# Patient Record
Sex: FEMALE | Race: WHITE | NOT HISPANIC OR LATINO | Employment: OTHER | URBAN - METROPOLITAN AREA
[De-identification: names, ages, dates, MRNs, and addresses within clinical notes are randomized per-mention and may not be internally consistent; named-entity substitution may affect disease eponyms.]

---

## 2017-03-20 ENCOUNTER — HOSPITAL ENCOUNTER (OUTPATIENT)
Dept: FAMILY MEDICINE CLINIC | Facility: CLINIC | Age: 67
Setting detail: SPECIMEN
Discharge: HOME | End: 2017-03-20
Attending: FAMILY MEDICINE | Admitting: FAMILY MEDICINE

## 2017-10-02 ENCOUNTER — HOSPITAL ENCOUNTER (OUTPATIENT)
Dept: FAMILY MEDICINE CLINIC | Facility: CLINIC | Age: 67
Setting detail: SPECIMEN
Discharge: HOME | End: 2017-10-02
Attending: FAMILY MEDICINE | Admitting: FAMILY MEDICINE

## 2017-10-02 LAB
ALBUMIN SERPL-MCNC: 3.7 G/DL (ref 3.5–4.8)
ALBUMIN/GLOB SERPL: 1.2 {RATIO} (ref 1–1.7)
ALP SERPL-CCNC: 71 IU/L (ref 32–91)
ALT SERPL-CCNC: 27 IU/L (ref 14–54)
ANION GAP SERPL CALC-SCNC: 12.5 MMOL/L (ref 10–20)
AST SERPL-CCNC: 25 IU/L (ref 15–41)
BASOPHILS # BLD AUTO: 0.1 10*3/UL (ref 0–0.2)
BASOPHILS NFR BLD AUTO: 1 % (ref 0–2)
BILIRUB SERPL-MCNC: 0.7 MG/DL (ref 0.3–1.2)
BUN SERPL-MCNC: 17 MG/DL (ref 8–20)
BUN/CREAT SERPL: 24.3 (ref 5.4–26.2)
CALCIUM SERPL-MCNC: 9.2 MG/DL (ref 8.9–10.3)
CHLORIDE SERPL-SCNC: 100 MMOL/L (ref 101–111)
CHOLEST SERPL-MCNC: 137 MG/DL
CHOLEST/HDLC SERPL: 3 {RATIO}
CONV CO2: 28 MMOL/L (ref 22–32)
CONV LDL CHOLESTEROL DIRECT: 75 MG/DL (ref 0–100)
CONV TOTAL PROTEIN: 6.8 G/DL (ref 6.1–7.9)
CREAT UR-MCNC: 0.7 MG/DL (ref 0.4–1)
DIFFERENTIAL METHOD BLD: (no result)
EOSINOPHIL # BLD AUTO: 0.3 10*3/UL (ref 0–0.3)
EOSINOPHIL # BLD AUTO: 4 % (ref 0–3)
ERYTHROCYTE [DISTWIDTH] IN BLOOD BY AUTOMATED COUNT: 13.6 % (ref 11.5–14.5)
GLOBULIN UR ELPH-MCNC: 3.1 G/DL (ref 2.5–3.8)
GLUCOSE SERPL-MCNC: 159 MG/DL (ref 65–99)
HCT VFR BLD AUTO: 38.7 % (ref 35–49)
HDLC SERPL-MCNC: 46 MG/DL
HGB BLD-MCNC: 13.2 G/DL (ref 12–15)
LDLC/HDLC SERPL: 1.6 {RATIO}
LIPID INTERPRETATION: NORMAL
LYMPHOCYTES # BLD AUTO: 1.8 10*3/UL (ref 0.8–4.8)
LYMPHOCYTES NFR BLD AUTO: 22 % (ref 18–42)
MCH RBC QN AUTO: 29.5 PG (ref 26–32)
MCHC RBC AUTO-ENTMCNC: 34 G/DL (ref 32–36)
MCV RBC AUTO: 86.7 FL (ref 80–94)
MONOCYTES # BLD AUTO: 0.6 10*3/UL (ref 0.1–1.3)
MONOCYTES NFR BLD AUTO: 7 % (ref 2–11)
NEUTROPHILS # BLD AUTO: 5.4 10*3/UL (ref 2.3–8.6)
NEUTROPHILS NFR BLD AUTO: 66 % (ref 50–75)
NRBC BLD AUTO-RTO: 0 /100{WBCS}
NRBC/RBC NFR BLD MANUAL: 0 10*3/UL
PLATELET # BLD AUTO: 237 10*3/UL (ref 150–450)
PMV BLD AUTO: 7.6 FL (ref 7.4–10.4)
POTASSIUM SERPL-SCNC: 3.5 MMOL/L (ref 3.6–5.1)
RBC # BLD AUTO: 4.46 10*6/UL (ref 4–5.4)
SODIUM SERPL-SCNC: 137 MMOL/L (ref 136–144)
TRIGL SERPL-MCNC: 117 MG/DL
VLDLC SERPL CALC-MCNC: 16.4 MG/DL
WBC # BLD AUTO: 8.2 10*3/UL (ref 4.5–11.5)

## 2017-11-06 ENCOUNTER — TELEPHONE (OUTPATIENT)
Dept: GASTROENTEROLOGY | Facility: CLINIC | Age: 67
End: 2017-11-06

## 2017-11-06 ENCOUNTER — OFFICE VISIT (OUTPATIENT)
Dept: GASTROENTEROLOGY | Facility: CLINIC | Age: 67
End: 2017-11-06

## 2017-11-06 VITALS
BODY MASS INDEX: 46.41 KG/M2 | HEIGHT: 62 IN | TEMPERATURE: 98 F | DIASTOLIC BLOOD PRESSURE: 76 MMHG | SYSTOLIC BLOOD PRESSURE: 124 MMHG | WEIGHT: 252.2 LBS

## 2017-11-06 DIAGNOSIS — R10.31 RIGHT LOWER QUADRANT ABDOMINAL PAIN: ICD-10-CM

## 2017-11-06 DIAGNOSIS — R10.13 EPIGASTRIC PAIN: Primary | ICD-10-CM

## 2017-11-06 DIAGNOSIS — E66.01 MORBID OBESITY (HCC): ICD-10-CM

## 2017-11-06 DIAGNOSIS — R13.14 PHARYNGOESOPHAGEAL DYSPHAGIA: ICD-10-CM

## 2017-11-06 PROCEDURE — 99204 OFFICE O/P NEW MOD 45 MIN: CPT | Performed by: INTERNAL MEDICINE

## 2017-11-06 RX ORDER — SAXAGLIPTIN 2.5 MG/1
TABLET, FILM COATED ORAL
Refills: 4 | COMMUNITY
Start: 2017-10-26 | End: 2019-07-30 | Stop reason: SDUPTHER

## 2017-11-06 RX ORDER — CHLORTHALIDONE 25 MG/1
TABLET ORAL
Refills: 4 | COMMUNITY
Start: 2017-10-25 | End: 2020-01-15 | Stop reason: SDUPTHER

## 2017-11-06 RX ORDER — ATORVASTATIN CALCIUM 40 MG/1
TABLET, FILM COATED ORAL
Refills: 5 | COMMUNITY
Start: 2017-10-25 | End: 2020-05-26

## 2017-11-06 RX ORDER — SODIUM CHLORIDE, SODIUM LACTATE, POTASSIUM CHLORIDE, CALCIUM CHLORIDE 600; 310; 30; 20 MG/100ML; MG/100ML; MG/100ML; MG/100ML
30 INJECTION, SOLUTION INTRAVENOUS CONTINUOUS
Status: CANCELLED | OUTPATIENT
Start: 2017-11-13

## 2017-11-06 RX ORDER — LOSARTAN POTASSIUM 100 MG/1
TABLET ORAL
Refills: 11 | COMMUNITY
Start: 2017-10-25 | End: 2020-02-24

## 2017-11-06 NOTE — TELEPHONE ENCOUNTER
Call to DR Angelia Springer's office  @ [phone] - VM left with Alejandra to request recent CT and labs be faxed to .    Call to [facility] @ [phone] and spoke with Shasha.  Request EGD and c/s with path reports be faxed to [phone].  Shasha states last EGD  and c/s in 2011 - no path report.  Faxing 8 pages.

## 2017-11-06 NOTE — PROGRESS NOTES
"Chief Complaint   Patient presents with   • Abdominal Pain       Subjective     HPI    Anais West is a 67 y.o. female with a past medical history noted below who presents for evaluation of abdominal pain.  Pain has been present for about 6 months.  Located at the epigastric region.  Feels like \" a whole lot of little knives\" in this area.  Not constant.  Not severe.  No associated with eating.  Feels this with laying down.  She has also feels a protrusion in this area-- like a bulging.  She also endorses R sided abominal pain, both upper and lower.  Painful to lay on her right side.    Awakes nauseated but there is no vomiting.  She did lose 40# with medifast in 2016 but has regained her weight back and feels it is mostly abdominal.  She is involved in a whole food program and trying to eat healthier.  She denies any heartburn/reflux.  She did have brief episode of dysphagia eating chinese food a few weeks ago. She had to force herself to vomit to resolve the pain and impaction.      She reports a CT of the abd and pelvis with her pcp for workup of her symptoms about a month ago and reports this as normal.      No constipation, some diarrhea in the morning with urgency.      No family history of colon cancer or polyps.    Last EGD and colonoscopy was with Demetrio at  about 2010.  She thinks they were normal.    No smoking.  No excess ETOH. 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        Past Medical History:   Diagnosis Date   • Anxiety    • Depression    • Diabetes mellitus    • Hyperlipidemia    • Hypertension          Current Outpatient Prescriptions:   •  atorvastatin (LIPITOR) 40 MG tablet, TK 1 T PO D HS, Disp: , Rfl: 5  •  chlorthalidone (HYGROTON) 25 MG tablet, TK 1 T PO D, Disp: , Rfl: 4  •  losartan (COZAAR) 100 MG tablet, TK 1 T PO D, Disp: , Rfl: 11  •  metFORMIN (GLUCOPHAGE) 1000 MG tablet, TK 1 T PO BID WF, Disp: , Rfl: 3  •  metoprolol tartrate (LOPRESSOR) 25 MG tablet, TK 1 T PO " BID, Disp: , Rfl: 5  •  ONGLYZA 2.5 MG tablet, TK 1 T PO D, Disp: , Rfl: 4  •  sertraline (ZOLOFT) 50 MG tablet, TK 1 T PO BID, Disp: , Rfl: 1    Allergies   Allergen Reactions   • Penicillins    • Sulfa Antibiotics        Social History     Social History   • Marital status:      Spouse name: N/A   • Number of children: N/A   • Years of education: N/A     Occupational History   • Not on file.     Social History Main Topics   • Smoking status: Never Smoker   • Smokeless tobacco: Never Used   • Alcohol use No   • Drug use: Not on file   • Sexual activity: Not on file     Other Topics Concern   • Not on file     Social History Narrative   • No narrative on file       History reviewed. No pertinent family history.    Review of Systems   Constitutional: Negative for activity change, appetite change and fatigue.   HENT: Positive for trouble swallowing. Negative for sore throat.    Eyes: Negative.    Respiratory: Negative.    Gastrointestinal: Positive for abdominal pain and nausea. Negative for abdominal distention, anal bleeding, blood in stool and vomiting.   Endocrine: Negative for cold intolerance and heat intolerance.   Genitourinary: Negative for difficulty urinating, dysuria and frequency.   Musculoskeletal: Negative for arthralgias, back pain and myalgias.   Skin: Negative.    Hematological: Negative for adenopathy. Does not bruise/bleed easily.   All other systems reviewed and are negative.      Objective     Vitals:    11/06/17 1017   BP: 124/76   Temp: 98 °F (36.7 °C)     Last 2 weights    11/06/17  1017   Weight: 252 lb 3.2 oz (114 kg)     Body mass index is 46.13 kg/(m^2).    Physical Exam   Constitutional: She is oriented to person, place, and time. She appears well-developed and well-nourished. No distress.   Morbidly obese, mostly central   HENT:   Head: Normocephalic and atraumatic.   Right Ear: External ear normal.   Left Ear: External ear normal.   Nose: Nose normal.   Mouth/Throat: Oropharynx  is clear and moist.   Eyes: Conjunctivae and EOM are normal. Right eye exhibits no discharge. Left eye exhibits no discharge. No scleral icterus.   Neck: Normal range of motion. Neck supple. No thyromegaly present.   No supraclavicular adenopathy   Cardiovascular: Normal rate, regular rhythm, normal heart sounds and intact distal pulses.  Exam reveals no gallop.    No murmur heard.  No lower extremity edema   Pulmonary/Chest: Effort normal and breath sounds normal. No respiratory distress. She has no wheezes.   Abdominal: Soft. Normal appearance and bowel sounds are normal. She exhibits no distension and no mass. There is no hepatosplenomegaly. There is tenderness. There is no rigidity, no rebound and no guarding.   Tender to palpation epigastric and RLQ   Genitourinary:   Genitourinary Comments: Rectal exam deferred   Musculoskeletal: Normal range of motion. She exhibits no edema or tenderness.   No atrophy of upper or lower extremities.  Normal digits and nails of both hands.   Lymphadenopathy:     She has no cervical adenopathy.   Neurological: She is alert and oriented to person, place, and time. She displays no atrophy. Coordination normal.   Skin: Skin is warm and dry. No rash noted. She is not diaphoretic. No erythema.   Psychiatric: She has a normal mood and affect. Her behavior is normal. Judgment and thought content normal.   Vitals reviewed.      No results found for: WBC, RBC, HGB, HCT, MCV, MCH, MCHC, RDW, RDWSD, MPV, PLT, NEUTRORELPCT, LYMPHORELPCT, MONORELPCT, EOSRELPCT, BASORELPCT, AUTOIGPER, NEUTROABS, LYMPHSABS, MONOSABS, EOSABS, BASOSABS, AUTOIGNUM, NRBC    No results found for: GLUCOSE, NA, K, CO2, CL, ANIONGAP, CREATININE, BUN, BCR, CALCIUM, EGFRIFNONA, ALKPHOS, PROTEINTOT, ALT, AST, BILITOT, ALBUMIN, GLOB, LABIL2      Imaging Results (last 7 days)     ** No results found for the last 168 hours. **            No notes on file    Assessment/Plan    1. Epigastric pain: ? Esophagitis vs hiatal  hernia vs musculskeletal.  I am concerned for some silent reflux given her recent dysphagia episode    2. Dysphagia: limited episode requiring forced regurgitation.  ? EoE, stricture, spasm    3. RLQ pain: ? Functional vs cramp, she reports a normal recent CT    4. Morbid obesity: BMI 46 with comorbidities    Plan  -EGD for further evaluation of epigastric pain, dysphagia  -will get her most recent labs and imaging from her pcp  -will get her last colonoscopy report  -to start IB Guard TID  -we discussed her obesity, I advised her to consider bariatric surgery due to her comorbidities and because she is healthy enough to benefit from it  -further recommendations after her EGD      Anais was seen today for abdominal pain.    Diagnoses and all orders for this visit:    Epigastric pain  -     Case Request; Standing  -     Implement Anesthesia Orders Day of Procedure; Standing  -     Obtain Informed Consent; Standing  -     lactated ringers infusion; Infuse 30 mL/hr into a venous catheter Continuous.  -     Case Request    Right lower quadrant abdominal pain    Morbid obesity    Pharyngoesophageal dysphagia  -     Case Request; Standing  -     Implement Anesthesia Orders Day of Procedure; Standing  -     Obtain Informed Consent; Standing  -     lactated ringers infusion; Infuse 30 mL/hr into a venous catheter Continuous.  -     Case Request      I have discussed the above plan with the patient.  They verbalize understanding and are in agreement with the plan.  They have been advised to contact the office for any questions, concerns, or changes related to their health.    Dictated utilizing Dragon dictation

## 2017-11-06 NOTE — PATIENT INSTRUCTIONS
Schedule the EGD    Start IB guard three times a day    I will get your past labs, CT and endoscopy reports    For any additional questions, concerns or changes to your condition after today's office visit please contact the office at [phone].

## 2017-11-06 NOTE — TELEPHONE ENCOUNTER
----- Message from Radhika Marcum MD sent at 11/6/2017 11:51 AM EST -----  She had CT and labs with her PCP, Angelia Springer recently, as well as an EGD and colonoscopy with Demetrio when he was at .  Can we pls get those results-- thanks!

## 2017-11-13 ENCOUNTER — HOSPITAL ENCOUNTER (OUTPATIENT)
Facility: HOSPITAL | Age: 67
Setting detail: HOSPITAL OUTPATIENT SURGERY
Discharge: HOME | End: 2017-11-13
Attending: INTERNAL MEDICINE | Admitting: INTERNAL MEDICINE

## 2017-11-13 VITALS
HEIGHT: 62 IN | SYSTOLIC BLOOD PRESSURE: 121 MMHG | HEART RATE: 93 BPM | TEMPERATURE: 98.8 F | OXYGEN SATURATION: 96 % | DIASTOLIC BLOOD PRESSURE: 61 MMHG | WEIGHT: 250.5 LBS | RESPIRATION RATE: 14 BRPM | BODY MASS INDEX: 46.1 KG/M2

## 2017-11-13 DIAGNOSIS — R10.13 EPIGASTRIC PAIN: ICD-10-CM

## 2017-11-13 DIAGNOSIS — R13.14 PHARYNGOESOPHAGEAL DYSPHAGIA: ICD-10-CM

## 2017-11-13 PROCEDURE — G0463 HOSPITAL OUTPT CLINIC VISIT: HCPCS | Performed by: INTERNAL MEDICINE

## 2017-11-13 RX ORDER — SODIUM CHLORIDE, SODIUM LACTATE, POTASSIUM CHLORIDE, CALCIUM CHLORIDE 600; 310; 30; 20 MG/100ML; MG/100ML; MG/100ML; MG/100ML
30 INJECTION, SOLUTION INTRAVENOUS CONTINUOUS
Status: DISCONTINUED | OUTPATIENT
Start: 2017-11-13 | End: 2017-11-13 | Stop reason: HOSPADM

## 2017-11-13 RX ORDER — SODIUM CHLORIDE 0.9 % (FLUSH) 0.9 %
1-10 SYRINGE (ML) INJECTION AS NEEDED
Status: DISCONTINUED | OUTPATIENT
Start: 2017-11-13 | End: 2017-11-13 | Stop reason: HOSPADM

## 2017-11-13 NOTE — NURSING NOTE
Dr Pugh returned call. Informed of new onset Bigeminy. Dr Pugh said she could go to the ER or call his office to be seen in the morning.  Patient informed, patient will see MD in office tomorrow. Patient states she is asymptomatic. Dr Pugh's office number given to patient. Patient dressed, discharged home with belongings.

## 2017-11-13 NOTE — PROGRESS NOTES
Pt in [facility], which is new.  Case discussed with Dr Cobb, who prefers cardiac evaluation prior to any sedation.  She does have a strong family history of cardiac disease.    Consult called to Dr Pugh, will see pt prior to discharge from endoscopy.    Radhika Marcum MD  [facility]

## 2017-11-13 NOTE — PERIOPERATIVE NURSING NOTE
Dr donald and dr franz notified of bigeminy, new for pt with no c/o pain or discomfort. Dr donald spoke with pt at bedside, cancelled case for today, will consult cardiology.

## 2017-11-13 NOTE — NURSING NOTE
Still waiting for return call from cardiology. Called [facility] office for cardiology consult for Dr Pugh to see patient in endo. No return call

## 2017-11-13 NOTE — NURSING NOTE
Dr Marcum called Dr Pugh's office office again for consult. Still waiting for patient to be seen Dr Pugh

## 2017-11-13 NOTE — PLAN OF CARE
Problem: Patient Care Overview (Adult)  Goal: Plan of Care Review  Outcome: Ongoing (interventions implemented as appropriate)  Goal: Adult Individualization and Mutuality  Outcome: Ongoing (interventions implemented as appropriate)  Goal: Discharge Needs Assessment  Outcome: Ongoing (interventions implemented as appropriate)    11/13/17 1421   Living Environment   Transportation Available car;family or friend will provide   Discharge Needs Assessment   Discharge Disposition home or self-care         Problem: GI Endoscopy (Adult)  Goal: Signs and Symptoms of Listed Potential Problems Will be Absent or Manageable (GI Endoscopy)  Outcome: Ongoing (interventions implemented as appropriate)

## 2017-11-15 ENCOUNTER — APPOINTMENT (OUTPATIENT)
Dept: LAB | Facility: HOSPITAL | Age: 67
End: 2017-11-15

## 2017-11-15 ENCOUNTER — OFFICE VISIT (OUTPATIENT)
Dept: CARDIOLOGY | Facility: CLINIC | Age: 67
End: 2017-11-15

## 2017-11-15 VITALS
BODY MASS INDEX: 46.45 KG/M2 | HEIGHT: 62 IN | DIASTOLIC BLOOD PRESSURE: 80 MMHG | HEART RATE: 81 BPM | SYSTOLIC BLOOD PRESSURE: 120 MMHG | WEIGHT: 252.4 LBS

## 2017-11-15 DIAGNOSIS — R06.09 DYSPNEA ON EXERTION: ICD-10-CM

## 2017-11-15 DIAGNOSIS — R00.8 VENTRICULAR BIGEMINY SEEN ON CARDIAC MONITOR: Primary | ICD-10-CM

## 2017-11-15 DIAGNOSIS — I10 ESSENTIAL HYPERTENSION: ICD-10-CM

## 2017-11-15 LAB
ALBUMIN SERPL-MCNC: 4.3 G/DL (ref 3.5–5.2)
ALBUMIN/GLOB SERPL: 1.2 G/DL
ALP SERPL-CCNC: 91 U/L (ref 39–117)
ALT SERPL W P-5'-P-CCNC: 26 U/L (ref 1–33)
ANION GAP SERPL CALCULATED.3IONS-SCNC: 13.9 MMOL/L
AST SERPL-CCNC: 18 U/L (ref 1–32)
BASOPHILS # BLD AUTO: 0.04 10*3/MM3 (ref 0–0.2)
BASOPHILS NFR BLD AUTO: 0.4 % (ref 0–1.5)
BILIRUB SERPL-MCNC: 0.5 MG/DL (ref 0.1–1.2)
BUN BLD-MCNC: 18 MG/DL (ref 8–23)
BUN/CREAT SERPL: 28.6 (ref 7–25)
CALCIUM SPEC-SCNC: 9.9 MG/DL (ref 8.6–10.5)
CHLORIDE SERPL-SCNC: 96 MMOL/L (ref 98–107)
CO2 SERPL-SCNC: 29.1 MMOL/L (ref 22–29)
CREAT BLD-MCNC: 0.63 MG/DL (ref 0.57–1)
DEPRECATED RDW RBC AUTO: 44 FL (ref 37–54)
EOSINOPHIL # BLD AUTO: 0.23 10*3/MM3 (ref 0–0.7)
EOSINOPHIL NFR BLD AUTO: 2.5 % (ref 0.3–6.2)
ERYTHROCYTE [DISTWIDTH] IN BLOOD BY AUTOMATED COUNT: 13.4 % (ref 11.7–13)
GFR SERPL CREATININE-BSD FRML MDRD: 95 ML/MIN/1.73
GLOBULIN UR ELPH-MCNC: 3.6 GM/DL
GLUCOSE BLD-MCNC: 182 MG/DL (ref 65–99)
HCT VFR BLD AUTO: 42.6 % (ref 35.6–45.5)
HGB BLD-MCNC: 13.7 G/DL (ref 11.9–15.5)
IMM GRANULOCYTES # BLD: 0.02 10*3/MM3 (ref 0–0.03)
IMM GRANULOCYTES NFR BLD: 0.2 % (ref 0–0.5)
LYMPHOCYTES # BLD AUTO: 2.68 10*3/MM3 (ref 0.9–4.8)
LYMPHOCYTES NFR BLD AUTO: 29.4 % (ref 19.6–45.3)
MAGNESIUM SERPL-MCNC: 2.1 MG/DL (ref 1.6–2.4)
MCH RBC QN AUTO: 29.2 PG (ref 26.9–32)
MCHC RBC AUTO-ENTMCNC: 32.2 G/DL (ref 32.4–36.3)
MCV RBC AUTO: 90.8 FL (ref 80.5–98.2)
MONOCYTES # BLD AUTO: 0.39 10*3/MM3 (ref 0.2–1.2)
MONOCYTES NFR BLD AUTO: 4.3 % (ref 5–12)
NEUTROPHILS # BLD AUTO: 5.76 10*3/MM3 (ref 1.9–8.1)
NEUTROPHILS NFR BLD AUTO: 63.2 % (ref 42.7–76)
PLATELET # BLD AUTO: 283 10*3/MM3 (ref 140–500)
PMV BLD AUTO: 9.6 FL (ref 6–12)
POTASSIUM BLD-SCNC: 3.8 MMOL/L (ref 3.5–5.2)
PROT SERPL-MCNC: 7.9 G/DL (ref 6–8.5)
RBC # BLD AUTO: 4.69 10*6/MM3 (ref 3.9–5.2)
SODIUM BLD-SCNC: 139 MMOL/L (ref 136–145)
T4 FREE SERPL-MCNC: 1.17 NG/DL (ref 0.93–1.7)
TSH SERPL DL<=0.05 MIU/L-ACNC: 2.06 MIU/ML (ref 0.27–4.2)
WBC NRBC COR # BLD: 9.12 10*3/MM3 (ref 4.5–10.7)

## 2017-11-15 PROCEDURE — 84443 ASSAY THYROID STIM HORMONE: CPT | Performed by: NURSE PRACTITIONER

## 2017-11-15 PROCEDURE — 83735 ASSAY OF MAGNESIUM: CPT | Performed by: NURSE PRACTITIONER

## 2017-11-15 PROCEDURE — 80053 COMPREHEN METABOLIC PANEL: CPT | Performed by: NURSE PRACTITIONER

## 2017-11-15 PROCEDURE — 36415 COLL VENOUS BLD VENIPUNCTURE: CPT | Performed by: NURSE PRACTITIONER

## 2017-11-15 PROCEDURE — 85025 COMPLETE CBC W/AUTO DIFF WBC: CPT | Performed by: NURSE PRACTITIONER

## 2017-11-15 PROCEDURE — 84439 ASSAY OF FREE THYROXINE: CPT | Performed by: NURSE PRACTITIONER

## 2017-11-15 PROCEDURE — 99243 OFF/OP CNSLTJ NEW/EST LOW 30: CPT | Performed by: NURSE PRACTITIONER

## 2017-11-15 NOTE — PROGRESS NOTES
Date of Office Visit: 11/15/2017  Encounter Provider: LINDA Batres  Place of Service: [facility]  Patient Name: Anais West  :1950    Chief Complaint   Patient presents with   • Shortness of Breath   :     HPI: Anais West is a 67 y.o. female who presents today for evaluation of abnormal telemetry strip indicating ventricular bigeminy.  This visit is a consultation requested by Dr. Radhika Marcum.  She has a past medical history of anxiety, depression, diabetes mellitus, hypertension, hyperlipidemia, and sleep apnea (compliant with CPAP machine). She reports a family history of her father having a heart attack at age 50 and passing away at age 68.  She had a brother that had a myocardial infarction at age 50 and a second brother that had stent placement at age 50.  Her mother's history and tell atrial fibrillation.  Her blood pressure and heart rate are both well-controlled on her current medication regimen.    She has been experiencing epigastric discomfort that she describes a sharp when lying down accompanied with nausea.  She recently underwent endoscopy on 17 and while on the telemetry monitor was noted to have ventricular bigeminy (new onset).  Dr. Abiodun Pugh was notified by Dr. Marcum and she was recommended to follow-up in our office for further evaluation. She was asymptomatic during the bigeminy.  Her EGD was canceled.    She denies any palpitations.  She occasionally will experience a midsternal chest pressure when lying on her back and her symptoms resolved when she turns to the side.  She denies any chest pain with exertional activities.  She does experience shortness of breath when doing housework which is the most activity that she typically will do.  Her symptoms will resolve if she slows down or with rest.  She denies paroxysmal nocturnal dyspnea, orthopnea, cough, edema, dizziness, or syncope.  She denies recently being ill  or having fevers.  She has not taken any decongestants, illicit drugs, increased caffeinated beverage intake or alcohol.    Past Medical History:   Diagnosis Date   • Anxiety    • Depression    • Diabetes mellitus    • Hyperlipidemia    • Hypertension    • Sleep apnea        Past Surgical History:   Procedure Laterality Date   • CHOLECYSTECTOMY     • COLONOSCOPY  2010    [facility] patient normal    • HYSTERECTOMY     • UPPER GASTROINTESTINAL ENDOSCOPY  2010    [facility] patient normal        Social History     Social History   • Marital status:      Spouse name: N/A   • Number of children: N/A   • Years of education: N/A     Occupational History   • Not on file.     Social History Main Topics   • Smoking status: Never Smoker   • Smokeless tobacco: Never Used   • Alcohol use No      Comment: Daily caffeine use   • Drug use: No   • Sexual activity: Defer     Other Topics Concern   • Not on file     Social History Narrative       Family History   Problem Relation Age of Onset   • Atrial fibrillation Mother    • Heart disease Father    • Heart attack Brother    • Heart disease Brother    • Heart disease Maternal Grandmother    • Heart disease Brother        Review of Systems   Constitution: Positive for malaise/fatigue. Negative for chills, diaphoresis, fever, night sweats, weight gain and weight loss.   HENT: Negative for hearing loss, nosebleeds, sore throat and tinnitus.    Eyes: Negative for blurred vision, double vision, pain and visual disturbance.   Cardiovascular: Positive for dyspnea on exertion. Negative for chest pain, claudication, cyanosis, irregular heartbeat, leg swelling, near-syncope, orthopnea, palpitations, paroxysmal nocturnal dyspnea and syncope.   Respiratory: Negative for cough, hemoptysis, snoring and wheezing.    Endocrine: Positive for heat intolerance. Negative for cold intolerance and polyuria.   Hematologic/Lymphatic: Negative for bleeding problem. Does not  "bruise/bleed easily.   Skin: Negative for color change, dry skin, flushing and itching.   Musculoskeletal: Positive for joint pain and myalgias. Negative for falls, joint swelling, muscle cramps and muscle weakness.   Gastrointestinal: Positive for abdominal pain, diarrhea and nausea. Negative for constipation, heartburn, melena and vomiting.   Genitourinary: Positive for frequency. Negative for dysuria and hematuria.   Neurological: Positive for excessive daytime sleepiness. Negative for dizziness, light-headedness, loss of balance, numbness, paresthesias, seizures and vertigo.   Psychiatric/Behavioral: Positive for depression. Negative for altered mental status, memory loss and substance abuse. The patient is nervous/anxious. The patient does not have insomnia.    Allergic/Immunologic: Negative for environmental allergies.       Allergies   Allergen Reactions   • Penicillins    • Sulfa Antibiotics          Current Outpatient Prescriptions:   •  atorvastatin (LIPITOR) 40 MG tablet, take 1 po daily, Disp: , Rfl: 5  •  chlorthalidone (HYGROTON) 25 MG tablet, take 1 po daily, Disp: , Rfl: 4  •  losartan (COZAAR) 100 MG tablet, take 1 po daily, Disp: , Rfl: 11  •  metFORMIN (GLUCOPHAGE) 1000 MG tablet, take 1 po  twice daily, Disp: , Rfl: 3  •  metoprolol tartrate (LOPRESSOR) 25 MG tablet, take 1 po twice daily, Disp: , Rfl: 5  •  ONGLYZA 2.5 MG tablet, take 1 po daily, Disp: , Rfl: 4  •  sertraline (ZOLOFT) 50 MG tablet, take 1 po twice daily, Disp: , Rfl: 1     Objective:     Vitals:    11/15/17 1333 11/15/17 1334   BP: 120/80 120/80   BP Location: Right arm Left arm   Pulse: 81    Weight: 252 lb 6.4 oz (114 kg)    Height: 62\" (157.5 cm)      Body mass index is 46.16 kg/(m^2).    PHYSICAL EXAM:    Vitals Reviewed.   General Appearance: No acute distress, well developed and well nourished. Obese.   Eyes: Conjunctiva and lids: No erythema, swelling, or discharge. Sclera non-icteric. Glasses.   HENT: Atraumatic, " normocephalic. External eyes, ears, and nose normal. No hearing loss noted. Mucous membranes normal. Lips not cyanotic. Neck supple with no tenderness.  Respiratory: No signs of respiratory distress. Respiration rhythm and depth normal.   Clear to auscultation. No rales, crackles, rhonchi, or wheezing auscultated.   Cardiovascular:  Jugular Venous Pressure: Normal  Heart Rate and Rhythm: Normal, Heart Sounds: Normal S1 and S2. No S3 or S4 noted.  Murmurs: No murmurs noted. No rubs, thrills, or gallops.   Arterial Pulses: Carotid pulses normal. No carotid bruit noted. Posterior tibialis and dorsalis pedis pulses normal.   Lower Extremities: No edema noted.  Gastrointestinal:  Abdomen soft, non-distended, non-tender. Normal bowel sounds. No hepatomegaly.   Musculoskeletal: Normal movement of extremities  Skin and Nails: General appearance normal. No pallor, cyanosis, diaphoresis. Skin temperature normal. No clubbing of fingernails.   Psychiatric: Patient alert and oriented to person, place, and time. Speech and behavior appropriate. Normal mood and affect.       ECG 12 Lead  Date/Time: 11/16/2017 1:29 PM  Performed by: JAELYN LYLES  Authorized by: JAELYN LYLES   Comparison: not compared with previous ECG   Rhythm: sinus rhythm  Rate: normal  BPM: 81  Conduction: conduction normal  ST Segments: ST segments normal  T Waves: T waves normal  QRS axis: normal  Other: no other findings  Clinical impression: normal ECG              Assessment:       Diagnosis Plan   1. Ventricular bigeminy seen on cardiac monitor  Adult Stress Echo W/ Cont or Stress Agent if Necessary Per Protocol    Comprehensive Metabolic Panel    CBC Auto Differential    Magnesium    TSH    T4, Free   2. Dyspnea on exertion  Adult Stress Echo W/ Cont or Stress Agent if Necessary Per Protocol    Comprehensive Metabolic Panel    CBC Auto Differential    Magnesium    TSH    T4, Free   3. Essential hypertension  Comprehensive Metabolic Panel    CBC  Auto Differential    Magnesium    TSH    T4, Free          Plan:       Mrs. West presents today for evaluation of ventricular bigeminy seen on her telemetry monitor before having an EGD.  The EGD was subsequently canceled.  She denies any palpitations, but does experience dyspnea upon exertion.  I discussed plan of care Dr. Abiodun Pugh.  We will obtain some basic blood work including a CBC, CMP, magnesium, TSH, and free T4.  We will arrange for her to have a stress echocardiogram to be completed in our office and I will call her with the results.  Her blood pressure is well-controlled today and EKG is normal.    As always, it has been a pleasure to participate in your patient's care.      Sincerely,         LINDA Mayes

## 2017-11-15 NOTE — PATIENT INSTRUCTIONS
Premature Ventricular Contraction (PVC)  Bigemney - PVC  Every other normal beat    A premature ventricular contraction is an irregularity in the normal heart rhythm. These contractions are extra heartbeats that occur too early in the normal sequence. In most cases, these contractions are harmless and do not require treatment.  CAUSES  Premature ventricular contractions may occur without a known cause. In healthy people, the extra contractions may be caused by:  · Smoking.  · Drinking alcohol.  · Caffeine.  · Certain medicines.  · Some illegal drugs.  · Stress.  Sometimes, changes in chemicals in the blood (electrolytes) can also cause premature ventricular contractions. They can also occur in people with heart diseases that cause a decrease in blood flow to the heart.  SIGNS AND SYMPTOMS  Premature ventricular contractions often do not cause any symptoms. In some cases, you may have a feeling of your heart beating fast or skipping a beat (palpitations).  DIAGNOSIS  Your health care provider will take your medical history and do a physical exam. During the exam, the health care provider will check for irregular heartbeats. Various tests may be done to help diagnose premature ventricular contractions. These tests may include:  · An ECG (electrocardiogram) to monitor the electrical activity of your heart.  · Holter monitor testing. A Holter monitor is a portable device that can monitor the electrical activity of your heart over longer periods of time.  · Stress tests to see how exercise affects your heart rhythm.  · Echocardiogram. This test uses sound waves (ultrasound) to produce an image of your heart.  · Electrophysiology study. This is used to evaluate the electrical conduction system of your heart.  TREATMENT  Usually, no treatment is needed. You may be advised to avoid things that can trigger the premature contractions, such as caffeine or alcohol. Medicines are sometimes given if symptoms are severe or if the  extra heartbeats are very frequent. Treatment may also be needed for an underlying cause of the contractions if one is found.  HOME CARE INSTRUCTIONS  · Take medicines only as directed by your health care provider.  · Make any lifestyle changes recommended by your health care provider. These may include:    Quitting smoking.    Avoiding or limiting caffeine or alcohol.    Exercising. Talk to your health care provider about what type of exercise is safe for you.    Trying to reduce stress.  · Keep all follow-up visits with your health care provider. This is important.  SEEK IMMEDIATE MEDICAL CARE IF:  · You feel palpitations that are frequent or continual.  · You have chest pain.  · You have shortness of breath.  · You have sweating for no reason.  · You have nausea and vomiting.  · You become light-headed or faint.     This information is not intended to replace advice given to you by your health care provider. Make sure you discuss any questions you have with your health care provider.     Document Released: 08/04/2005 Document Revised: 01/08/2016 Document Reviewed: 05/21/2015  [employer] Interactive Patient Education ©2017 [employer]

## 2017-11-16 ENCOUNTER — TELEPHONE (OUTPATIENT)
Dept: CARDIOLOGY | Facility: CLINIC | Age: 67
End: 2017-11-16

## 2017-11-16 PROCEDURE — 93000 ELECTROCARDIOGRAM COMPLETE: CPT | Performed by: NURSE PRACTITIONER

## 2017-11-16 NOTE — TELEPHONE ENCOUNTER
Labs Reviewed:     - TSH and free T4 normal  - BMP normal except for elevated glucose of 182, chloride 96 (low), CO2 29.1.  - CBC normal except for MCHC 32.2 low, RDW 13.4 high and monocyte percentage 4.3 low.  Hemoglobin and hematocrit normal    No changes. Patient informed.

## 2017-11-28 ENCOUNTER — HOSPITAL ENCOUNTER (OUTPATIENT)
Dept: CARDIOLOGY | Facility: HOSPITAL | Age: 67
Discharge: HOME | End: 2017-11-28
Admitting: NURSE PRACTITIONER

## 2017-11-28 VITALS
DIASTOLIC BLOOD PRESSURE: 84 MMHG | HEIGHT: 62 IN | SYSTOLIC BLOOD PRESSURE: 160 MMHG | HEART RATE: 98 BPM | WEIGHT: 252 LBS | BODY MASS INDEX: 46.38 KG/M2

## 2017-11-28 DIAGNOSIS — R06.09 DYSPNEA ON EXERTION: ICD-10-CM

## 2017-11-28 DIAGNOSIS — R00.8 VENTRICULAR BIGEMINY SEEN ON CARDIAC MONITOR: ICD-10-CM

## 2017-11-28 LAB
BH CV ECHO MEAS - ACS: 1.9 CM
BH CV ECHO MEAS - AO MAX PG: 6.9 MMHG
BH CV ECHO MEAS - AO ROOT AREA (BSA CORRECTED): 1.4
BH CV ECHO MEAS - AO ROOT AREA: 6.7 CM^2
BH CV ECHO MEAS - AO ROOT DIAM: 2.9 CM
BH CV ECHO MEAS - AO V2 MAX: 131.7 CM/SEC
BH CV ECHO MEAS - BSA(HAYCOCK): 2.3 M^2
BH CV ECHO MEAS - BSA: 2.1 M^2
BH CV ECHO MEAS - BZI_BMI: 46.1 KILOGRAMS/M^2
BH CV ECHO MEAS - BZI_METRIC_HEIGHT: 157.5 CM
BH CV ECHO MEAS - BZI_METRIC_WEIGHT: 114.3 KG
BH CV ECHO MEAS - CONTRAST EF 4CH: 58.7 ML/M^2
BH CV ECHO MEAS - EDV(MOD-SP4): 109 ML
BH CV ECHO MEAS - EDV(TEICH): 109.6 ML
BH CV ECHO MEAS - EF(CUBED): 65 %
BH CV ECHO MEAS - EF(MOD-SP4): 58.7 %
BH CV ECHO MEAS - EF(TEICH): 56.4 %
BH CV ECHO MEAS - ESV(MOD-SP4): 45 ML
BH CV ECHO MEAS - ESV(TEICH): 47.8 ML
BH CV ECHO MEAS - FS: 29.5 %
BH CV ECHO MEAS - IVS/LVPW: 0.87
BH CV ECHO MEAS - IVSD: 0.92 CM
BH CV ECHO MEAS - LAT PEAK E' VEL: 15 CM/SEC
BH CV ECHO MEAS - LV DIASTOLIC VOL/BSA (35-75): 51.7 ML/M^2
BH CV ECHO MEAS - LV MASS(C)D: 170.4 GRAMS
BH CV ECHO MEAS - LV MASS(C)DI: 80.8 GRAMS/M^2
BH CV ECHO MEAS - LV SYSTOLIC VOL/BSA (12-30): 21.3 ML/M^2
BH CV ECHO MEAS - LVIDD: 4.8 CM
BH CV ECHO MEAS - LVIDS: 3.4 CM
BH CV ECHO MEAS - LVLD AP4: 7.4 CM
BH CV ECHO MEAS - LVLS AP4: 6.5 CM
BH CV ECHO MEAS - LVPWD: 1.1 CM
BH CV ECHO MEAS - MED PEAK E' VEL: 8 CM/SEC
BH CV ECHO MEAS - MV A DUR: 0.12 SEC
BH CV ECHO MEAS - MV A MAX VEL: 90.7 CM/SEC
BH CV ECHO MEAS - MV DEC SLOPE: 197 CM/SEC^2
BH CV ECHO MEAS - MV DEC TIME: 0.29 SEC
BH CV ECHO MEAS - MV E MAX VEL: 58.2 CM/SEC
BH CV ECHO MEAS - MV E/A: 0.64
BH CV ECHO MEAS - MV P1/2T MAX VEL: 59.7 CM/SEC
BH CV ECHO MEAS - MV P1/2T: 88.7 MSEC
BH CV ECHO MEAS - MVA P1/2T LCG: 3.7 CM^2
BH CV ECHO MEAS - MVA(P1/2T): 2.5 CM^2
BH CV ECHO MEAS - PULM A REVS DUR: 0.1 SEC
BH CV ECHO MEAS - PULM A REVS VEL: 24.8 CM/SEC
BH CV ECHO MEAS - PULM DIAS VEL: 36.7 CM/SEC
BH CV ECHO MEAS - PULM S/D: 0.94
BH CV ECHO MEAS - PULM SYS VEL: 34.3 CM/SEC
BH CV ECHO MEAS - SI(CUBED): 34.9 ML/M^2
BH CV ECHO MEAS - SI(MOD-SP4): 30.3 ML/M^2
BH CV ECHO MEAS - SI(TEICH): 29.3 ML/M^2
BH CV ECHO MEAS - SV(CUBED): 73.7 ML
BH CV ECHO MEAS - SV(MOD-SP4): 64 ML
BH CV ECHO MEAS - SV(TEICH): 61.8 ML
BH CV ECHO MEAS - TAPSE (>1.6): 2.3 CM2
BH CV STRESS BP STAGE 1: NORMAL
BH CV STRESS BP STAGE 2: NORMAL
BH CV STRESS DURATION MIN STAGE 1: 3
BH CV STRESS DURATION MIN STAGE 2: 1
BH CV STRESS DURATION SEC STAGE 1: 0
BH CV STRESS DURATION SEC STAGE 2: 30
BH CV STRESS ECHO POST STRESS EJECTION FRACTION EF: 67 %
BH CV STRESS GRADE STAGE 1: 10
BH CV STRESS GRADE STAGE 2: 12
BH CV STRESS HR STAGE 1: 136
BH CV STRESS HR STAGE 2: 152
BH CV STRESS METS STAGE 1: 5
BH CV STRESS METS STAGE 2: 7.5
BH CV STRESS PROTOCOL 1: NORMAL
BH CV STRESS SPEED STAGE 1: 1.7
BH CV STRESS SPEED STAGE 2: 2.5
BH CV STRESS STAGE 1: 1
BH CV STRESS STAGE 2: 2
BH CV XLRA - RV BASE: 2.1 CM
BH CV XLRA - TDI S': 13 CM/SEC
E/E' RATIO: 5.5
LEFT ATRIUM VOLUME INDEX: 12 ML/M2
LV EF 2D ECHO EST: 59 %
MAXIMAL PREDICTED HEART RATE: 155 BPM
PERCENT MAX PREDICTED HR: 98.06 %
STRESS BASELINE BP: NORMAL MMHG
STRESS BASELINE HR: 98 BPM
STRESS O2 SAT REST: 99 %
STRESS PERCENT HR: 115 %
STRESS POST ESTIMATED WORKLOAD: 6 METS
STRESS POST EXERCISE DUR MIN: 4 MIN
STRESS POST EXERCISE DUR SEC: 30 SEC
STRESS POST PEAK BP: NORMAL MMHG
STRESS POST PEAK HR: 152 BPM
STRESS TARGET HR: 132 BPM

## 2017-11-28 PROCEDURE — 93017 CV STRESS TEST TRACING ONLY: CPT

## 2017-11-28 PROCEDURE — 93018 CV STRESS TEST I&R ONLY: CPT | Performed by: INTERNAL MEDICINE

## 2017-11-28 PROCEDURE — 93350 STRESS TTE ONLY: CPT

## 2017-11-28 PROCEDURE — 93325 DOPPLER ECHO COLOR FLOW MAPG: CPT

## 2017-11-28 PROCEDURE — 93320 DOPPLER ECHO COMPLETE: CPT

## 2017-11-28 PROCEDURE — 93350 STRESS TTE ONLY: CPT | Performed by: INTERNAL MEDICINE

## 2017-11-28 PROCEDURE — 25010000002 PERFLUTREN (DEFINITY) 8.476 MG IN SODIUM CHLORIDE 0.9 % 10 ML INJECTION: Performed by: NURSE PRACTITIONER

## 2017-11-28 PROCEDURE — 93016 CV STRESS TEST SUPVJ ONLY: CPT | Performed by: INTERNAL MEDICINE

## 2017-11-28 PROCEDURE — 93325 DOPPLER ECHO COLOR FLOW MAPG: CPT | Performed by: INTERNAL MEDICINE

## 2017-11-28 PROCEDURE — 93352 ADMIN ECG CONTRAST AGENT: CPT | Performed by: INTERNAL MEDICINE

## 2017-11-28 PROCEDURE — 93320 DOPPLER ECHO COMPLETE: CPT | Performed by: INTERNAL MEDICINE

## 2017-11-28 RX ADMIN — PERFLUTREN 3 ML: 6.52 INJECTION, SUSPENSION INTRAVENOUS at 10:18

## 2017-11-29 ENCOUNTER — TELEPHONE (OUTPATIENT)
Dept: CARDIOLOGY | Facility: CLINIC | Age: 67
End: 2017-11-29

## 2017-11-29 NOTE — TELEPHONE ENCOUNTER
I called the patient and informed her of her normal stress echocardiogram.  There was no evidence of myocardial ischemia.  She did have some occasional PACs, PVCs, and couplets as well as some SVT.  From a cardiac standpoint, I am not recommending any further workup prior to her endoscopy.

## 2017-12-01 DIAGNOSIS — R10.13 EPIGASTRIC PAIN: Primary | ICD-10-CM

## 2017-12-01 RX ORDER — SODIUM CHLORIDE, SODIUM LACTATE, POTASSIUM CHLORIDE, CALCIUM CHLORIDE 600; 310; 30; 20 MG/100ML; MG/100ML; MG/100ML; MG/100ML
30 INJECTION, SOLUTION INTRAVENOUS CONTINUOUS
Status: CANCELLED | OUTPATIENT
Start: 2017-12-01

## 2017-12-04 DIAGNOSIS — K63.5 POLYP OF TRANSVERSE COLON, UNSPECIFIED TYPE: ICD-10-CM

## 2017-12-04 DIAGNOSIS — R10.13 EPIGASTRIC PAIN: Primary | ICD-10-CM

## 2017-12-04 RX ORDER — SODIUM CHLORIDE, SODIUM LACTATE, POTASSIUM CHLORIDE, CALCIUM CHLORIDE 600; 310; 30; 20 MG/100ML; MG/100ML; MG/100ML; MG/100ML
30 INJECTION, SOLUTION INTRAVENOUS CONTINUOUS
Status: CANCELLED | OUTPATIENT
Start: 2018-05-01

## 2017-12-05 ENCOUNTER — TELEPHONE (OUTPATIENT)
Dept: CARDIOLOGY | Facility: CLINIC | Age: 67
End: 2017-12-05

## 2017-12-05 NOTE — TELEPHONE ENCOUNTER
Patient called, informing me that she was informed that stress echo is normal.  But she was called, and told to see Dr. Pugh in January.  Patient would like to know why she needs to see  if her stress test is normal.  Please advise.  Kellie

## 2017-12-06 NOTE — TELEPHONE ENCOUNTER
I spoke with patient via phone. I will further discuss with Dr. Abiodun Pugh if she needs a holter monitor and f/u appt in January. I also need to send a cardiac evaluation letter for the endoscopy.

## 2017-12-21 NOTE — TELEPHONE ENCOUNTER
I spoke with Dr. Pugh about patient.  He said he was just wait to see her in January and she does not need any testing to be completed before then.  Please call patient and let her know.

## 2018-04-02 ENCOUNTER — OFFICE VISIT (OUTPATIENT)
Dept: CARDIOLOGY | Facility: CLINIC | Age: 68
End: 2018-04-02

## 2018-04-02 VITALS
WEIGHT: 254 LBS | BODY MASS INDEX: 46.74 KG/M2 | HEIGHT: 62 IN | DIASTOLIC BLOOD PRESSURE: 70 MMHG | HEART RATE: 82 BPM | SYSTOLIC BLOOD PRESSURE: 124 MMHG

## 2018-04-02 DIAGNOSIS — E11.9 TYPE 2 DIABETES MELLITUS WITHOUT COMPLICATION, WITHOUT LONG-TERM CURRENT USE OF INSULIN (HCC): ICD-10-CM

## 2018-04-02 DIAGNOSIS — E66.01 MORBID OBESITY WITH BMI OF 45.0-49.9, ADULT (HCC): ICD-10-CM

## 2018-04-02 DIAGNOSIS — Z82.49 FAMILY HISTORY OF HEART DISEASE IN MALE FAMILY MEMBER BEFORE AGE 55: ICD-10-CM

## 2018-04-02 DIAGNOSIS — I70.90 ATHEROSCLEROSIS: ICD-10-CM

## 2018-04-02 DIAGNOSIS — R07.89 ATYPICAL CHEST PAIN: ICD-10-CM

## 2018-04-02 DIAGNOSIS — Z01.818 PREOPERATIVE CLEARANCE: ICD-10-CM

## 2018-04-02 DIAGNOSIS — R00.8 VENTRICULAR BIGEMINY SEEN ON CARDIAC MONITOR: Primary | ICD-10-CM

## 2018-04-02 DIAGNOSIS — I10 ESSENTIAL HYPERTENSION: ICD-10-CM

## 2018-04-02 DIAGNOSIS — E78.49 OTHER HYPERLIPIDEMIA: ICD-10-CM

## 2018-04-02 PROCEDURE — 99214 OFFICE O/P EST MOD 30 MIN: CPT | Performed by: NURSE PRACTITIONER

## 2018-04-02 PROCEDURE — 93000 ELECTROCARDIOGRAM COMPLETE: CPT | Performed by: NURSE PRACTITIONER

## 2018-04-02 NOTE — PROGRESS NOTES
Date of Office Visit: 2018  Encounter Provider: LINDA Preston  Place of Service: [facility] CARDIOLOGY  Patient Name: Anais West  :1950    Chief Complaint   Patient presents with   • Chest Pain     atypical   • Fatigue   :     HPI: Anais West is a 67 y.o. female is a patient of Dr. Pugh. I am seeing her for the first time and have reviewed her records.    Her past medical history is significant of hypertension, hyperlipidemia, ventricular bigeminy,NEPTALI on CPAP, diabetes mellitus, anxiety, and depression. She also has a family history of heart disease with his father who had a heart attack at age 50 and passed away at age 68.  She also has a brother that had a myocardial infarction at age 50 and a second brother that had stent placement at age 50.  Her mother has a history of atrial fibrillation and cardioversion.    She was seen in 2017 by LINDA Mayes for valuation of abnormal telemetry strip indicating ventricular bigeminy.  She had been experiencing epigastric discomfort and had underwent an endoscopy study was noted to have new onset ventricular bigeminy on telemetry at that time.  She was asymptomatic during that bigeminy but her EGD was canceled.  She had some blood work that included a CBC, CMP, magnesium, TSH, and free T4 which were unremarkable.  A normal stress echo with no significant evidence for myocardial ischemia.  She did have occasional PACs occasional PVCs, SVT, and couplets during recovery.  She had normal left ventricular systolic function with an estimated EF of 59% and grade 1 diastolic dysfunction.    He presents today for follow-up.  Due to weather conditions and personal issues she has had to reschedule to visits with Dr. Pugh, who has not seen her.  She denies palpitations, shortness of breath, dizziness, lightheadedness, near syncope, or edema.  She does have fatigue.  She does not delete any worse than what  "she has experienced in the past.  She has occasional chest pressure that she describes as a heaviness that lasted for approximately 5 minutes.  She experienced this mainly when she is at rest in her recliner.  She does not have any chest pain or shortness of breath when she does exert herself like with laundry and vacuuming.  The chest pressure/heaviness occurs 3-4 times per week and resolved spontaneously on its own.  She does not associate this with deep breathing or ingesting food.  He continues to treat her sleep apnea with CPAP religiously.  She does not dissipate and routine exercise.  Allergies   Allergen Reactions   • Penicillins    • Sulfa Antibiotics        Past Medical History:   Diagnosis Date   • Abdominal pain    • Anxiety    • Depression    • Diabetes mellitus    • QUIÑONES (dyspnea on exertion)    • Excessive daytime sleepiness    • Heat intolerance    • Hiatal hernia    • Hyperlipidemia    • Hypertension    • Joint pain    • Malaise and fatigue    • Myalgia    • Nervously anxious    • Sleep apnea    • SOB (shortness of breath)    • Ventricular bigeminy 11/2017       Past Surgical History:   Procedure Laterality Date   • CHOLECYSTECTOMY     • COLONOSCOPY  2010    [facility] patient normal    • HYSTERECTOMY     • UPPER GASTROINTESTINAL ENDOSCOPY  2010    [facility] patient normal          Family and social history reviewed.     Review of Systems   Constitution: Positive for malaise/fatigue.   Cardiovascular: Positive for chest pain.   Respiratory: Positive for snoring. Negative for shortness of breath.    Hematologic/Lymphatic: Negative for bleeding problem.     All other systems were reviewed and are negative          Objective:     Vitals:    04/02/18 1304   BP: 124/70   BP Location: Left arm   Patient Position: Sitting   Pulse: 82   Weight: 115 kg (254 lb)   Height: 157.5 cm (62\")     Body mass index is 46.46 kg/m².    PHYSICAL EXAM:  Physical Exam   Constitutional: She is oriented to " person, place, and time. She appears well-developed and well-nourished. No distress.   obese   HENT:   Head: Normocephalic.   Eyes: Conjunctivae are normal.   Neck: Normal range of motion. No JVD present.   Cardiovascular: Normal rate, regular rhythm, normal heart sounds and intact distal pulses.    No murmur heard.  Pulses:       Carotid pulses are 2+ on the right side, and 2+ on the left side.       Radial pulses are 2+ on the right side, and 2+ on the left side.        Posterior tibial pulses are 2+ on the right side, and 2+ on the left side.   Pulmonary/Chest: Effort normal and breath sounds normal. No respiratory distress. She has no wheezes. She has no rhonchi. She has no rales. She exhibits no tenderness.   Abdominal: Soft. Bowel sounds are normal. She exhibits no distension.   Musculoskeletal: Normal range of motion. She exhibits no edema.   Neurological: She is alert and oriented to person, place, and time.   Skin: Skin is warm, dry and intact. No rash noted. She is not diaphoretic. No cyanosis.   Psychiatric: She has a normal mood and affect. Her behavior is normal. Judgment and thought content normal.         ECG 12 Lead  Date/Time: 4/2/2018 2:02 PM  Performed by: MAYITO ARGUELLES  Authorized by: MAYITO ARGUELLES   Comparison: compared with previous ECG   Rhythm: sinus rhythm  Rate: normal  BPM: 82  T depression: V1  T flattening: V2 and aVL  QRS axis: normal  Clinical impression: non-specific ECG  Comments: Nonspecific t wave abnormalities- unchanged from prior ecg            Current Outpatient Prescriptions   Medication Sig Dispense Refill   • atorvastatin (LIPITOR) 40 MG tablet take 1 po daily  5   • chlorthalidone (HYGROTON) 25 MG tablet take 1 po daily  4   • losartan (COZAAR) 100 MG tablet take 1 po daily  11   • metFORMIN (GLUCOPHAGE) 1000 MG tablet take 1 po  twice daily  3   • metoprolol tartrate (LOPRESSOR) 25 MG tablet take 1 po twice daily  5   • ONGLYZA 2.5 MG tablet take 1 po daily  4   •  "sertraline (ZOLOFT) 50 MG tablet take 1 po twice daily  1     No current facility-administered medications for this visit.      Assessment:       Diagnosis Plan   1. Ventricular bigeminy seen on cardiac monitor     2. Essential hypertension     3. Other hyperlipidemia     4. Morbid obesity with BMI of 45.0-49.9, adult     5. Atypical chest pain     6. Family history of heart disease in male family member before age 55     7. Type 2 diabetes mellitus without complication, without long-term current use of insulin     8. Atherosclerosis     9. Preoperative clearance          Orders Placed This Encounter   Procedures   • ECG 12 Lead     This order was created via procedure documentation         Plan:    This is a 67-year-old female with multiple risk factors for cardiac disease including a significant family history of heart disease.  She was referred to our office for evaluation of ventricular bigeminy that was captured on telemetry in November when she was to have a EGD but the procedure was stopped.  He had a normal stress echo with no evidence for myocardial ischemia on 11/28/17.  She did have occasional PACs, occasional PVCs, SVT, and couplets during recovery.  I have discussed her case with Dr. Mcclellan and will clear her for an EGD and colonoscopy with Dr. Marcum.      1. Ventricular ectopy- she is asymptomatic with this    2. Hypertension-blood pressure is well-controlled at 124/70 today.    3. Hyperlipidemia- she tolerates a target dose of atorvastatin 40 mg. Managed per PCP.    4. Morbid obesity- BMI 46.5    5. NEPTALI on CPAP    6. Atypical chest pain- her episodes occur at rest. She does not have chest pain when she exerts herself. She also had a normal stress echo in 11/2017. She is due to have an EGD to evaluate a \"mass\" at the base of the sternum.     7. DM- on oral hypoglycemics     8. Atherosclerosis- CT of abdomen in 10/2017 showed \"some vascular calcification\".    9. Preoperative clearance- will send " clearance letter to Dr. Marcum to clear the patient for EGD/Colonoscopy    10. Cardiovascular risk assessment- High probability of CAD with a  Risk CAD consortium score of 22%. WE discussed possible calcium scoring in the future considering multiple comorbidities and her family history of heart disease.       Plan of care reviewed with Dr. Mcclellan  Follow up in 3 months with Dr. Pugh    Patient was instructed to call the office if new symptoms develop or report to nearest ER if heart attack or stroke is suspected.        It has been a pleasure to participate in this patient's care.      Thank you,  LINDA Preston      **Zehra Disclaimer:**  Much of this encounter note is an electronic transcription/translation of spoken language to printed text. The electronic translation of spoken language may permit erroneous, or at times, nonsensical words or phrases to be inadvertently transcribed. Although I have reviewed the note for such errors, some may still exist.

## 2018-04-23 ENCOUNTER — OFFICE VISIT (OUTPATIENT)
Dept: GASTROENTEROLOGY | Facility: CLINIC | Age: 68
End: 2018-04-23

## 2018-04-23 VITALS
BODY MASS INDEX: 46.19 KG/M2 | SYSTOLIC BLOOD PRESSURE: 150 MMHG | HEIGHT: 62 IN | DIASTOLIC BLOOD PRESSURE: 84 MMHG | TEMPERATURE: 97.5 F | WEIGHT: 251 LBS

## 2018-04-23 DIAGNOSIS — R13.14 PHARYNGOESOPHAGEAL DYSPHAGIA: ICD-10-CM

## 2018-04-23 DIAGNOSIS — K63.5 POLYP OF TRANSVERSE COLON, UNSPECIFIED TYPE: ICD-10-CM

## 2018-04-23 DIAGNOSIS — R10.13 EPIGASTRIC PAIN: Primary | ICD-10-CM

## 2018-04-23 PROCEDURE — 99214 OFFICE O/P EST MOD 30 MIN: CPT | Performed by: INTERNAL MEDICINE

## 2018-04-23 RX ORDER — SODIUM CHLORIDE, SODIUM LACTATE, POTASSIUM CHLORIDE, CALCIUM CHLORIDE 600; 310; 30; 20 MG/100ML; MG/100ML; MG/100ML; MG/100ML
30 INJECTION, SOLUTION INTRAVENOUS CONTINUOUS
Status: CANCELLED | OUTPATIENT
Start: 2018-04-23

## 2018-04-23 RX ORDER — FAMOTIDINE 20 MG/1
20 TABLET, FILM COATED ORAL 2 TIMES DAILY PRN
Qty: 60 TABLET | Refills: 2 | Status: SHIPPED | OUTPATIENT
Start: 2018-04-23 | End: 2019-08-19

## 2018-04-23 NOTE — PROGRESS NOTES
Chief Complaint   Patient presents with   • Colon Polyps   • Abdominal Pain     Subjective     HPI  Anais West is a 67 y.o. female who presents for follow epigastric pain.  She was set up to have an EGD but was having ventricular bigeminy and anesthesia cancelled this.  She has since followed up with cardiology.  She has had a stress echo which was within normal limits.  She was most recently seen by Dr. Connolly's nurse practitioner last week; Dr. Devlin and was out by Dr. Harding gave the okay for endoscopy.  It is documented in the nurse's note.    Still feeling food sticking at her xiphoid region.  Recent episode with eating asparagus.  Required regurgitation.  She is still having some intermittent episodes of the epigastric pain.      Last EGD and colonoscopy with Dr Atkinson at .  She did have a 7mm transverse colon polyp.   Unfortunately, I do not have the path report    Past Medical History:   Diagnosis Date   • Abdominal pain    • Anxiety    • Cholelithiasis 1976    surgery   • Depression    • Diabetes mellitus    • QUIÑONES (dyspnea on exertion)    • Excessive daytime sleepiness    • Heat intolerance    • Hiatal hernia    • Hyperlipidemia    • Hypertension    • Joint pain    • Malaise and fatigue    • Myalgia    • Nervously anxious    • Sleep apnea    • SOB (shortness of breath)    • Ventricular bigeminy 11/2017       Social History     Social History   • Marital status:      Social History Main Topics   • Smoking status: Never Smoker   • Smokeless tobacco: Never Used   • Alcohol use No      Comment: Daily caffeine use   • Drug use: No   • Sexual activity: Yes     Partners: Male     Birth control/ protection: Post-menopausal     Other Topics Concern   • Not on file         Current Outpatient Prescriptions:   •  atorvastatin (LIPITOR) 40 MG tablet, take 1 po daily, Disp: , Rfl: 5  •  chlorthalidone (HYGROTON) 25 MG tablet, take 1 po daily, Disp: , Rfl: 4  •  losartan (COZAAR) 100 MG tablet,  take 1 po daily, Disp: , Rfl: 11  •  metFORMIN (GLUCOPHAGE) 1000 MG tablet, take 1 po  twice daily, Disp: , Rfl: 3  •  metoprolol tartrate (LOPRESSOR) 25 MG tablet, take 1 po twice daily, Disp: , Rfl: 5  •  ONGLYZA 2.5 MG tablet, take 1 po daily, Disp: , Rfl: 4  •  sertraline (ZOLOFT) 50 MG tablet, take 1 po twice daily, Disp: , Rfl: 1  •  famotidine (PEPCID) 20 MG tablet, Take 1 tablet by mouth 2 (Two) Times a Day As Needed for Indigestion or Heartburn., Disp: 60 tablet, Rfl: 2    Review of Systems   Constitutional: Negative for activity change, appetite change, chills and fever.   HENT: Positive for trouble swallowing.    Respiratory: Negative.    Cardiovascular: Negative.  Negative for chest pain.   Gastrointestinal: Positive for abdominal pain. Negative for abdominal distention, anal bleeding, constipation, diarrhea, nausea and vomiting.   Genitourinary: Negative for dysuria, frequency and hematuria.       Objective   Vitals:    04/23/18 0955   BP: 150/84   Temp: 97.5 °F (36.4 °C)     1    04/23/18  0955   Weight: 114 kg (251 lb)     Body mass index is 45.91 kg/m².      Physical Exam   Constitutional: She is oriented to person, place, and time. She appears well-developed and well-nourished. No distress.   obese   HENT:   Head: Normocephalic and atraumatic.   Right Ear: External ear normal.   Left Ear: External ear normal.   Nose: Nose normal.   Mouth/Throat: Oropharynx is clear and moist.   Eyes: Conjunctivae and EOM are normal. Right eye exhibits no discharge. Left eye exhibits no discharge. No scleral icterus.   Neck: Normal range of motion. Neck supple. No thyromegaly present.   No supraclavicular adenopathy   Cardiovascular: Normal rate, regular rhythm, normal heart sounds and intact distal pulses.  Exam reveals no gallop.    No murmur heard.  No lower extremity edema   Pulmonary/Chest: Effort normal and breath sounds normal. No respiratory distress. She has no wheezes.   Abdominal: Soft. Normal appearance  and bowel sounds are normal. She exhibits no distension and no mass. There is no hepatosplenomegaly. There is tenderness. There is no rigidity, no rebound and no guarding. No hernia.   Upper abdominal tenderness   Genitourinary:   Genitourinary Comments: Rectal exam deferred   Musculoskeletal: Normal range of motion. She exhibits no edema or tenderness.   No atrophy of upper or lower extremities.  Normal digits and nails of both hands.   Lymphadenopathy:     She has no cervical adenopathy.   Neurological: She is alert and oriented to person, place, and time. She displays no atrophy. Coordination normal.   Skin: Skin is warm and dry. No rash noted. She is not diaphoretic. No erythema.   Psychiatric: She has a normal mood and affect. Her behavior is normal. Judgment and thought content normal.   Vitals reviewed.      WBC   Date Value Ref Range Status   11/15/2017 9.12 4.50 - 10.70 10*3/mm3 Final     RBC   Date Value Ref Range Status   11/15/2017 4.69 3.90 - 5.20 10*6/mm3 Final     Hemoglobin   Date Value Ref Range Status   11/15/2017 13.7 11.9 - 15.5 g/dL Final     Hematocrit   Date Value Ref Range Status   11/15/2017 42.6 35.6 - 45.5 % Final     MCV   Date Value Ref Range Status   11/15/2017 90.8 80.5 - 98.2 fL Final     MCH   Date Value Ref Range Status   11/15/2017 29.2 26.9 - 32.0 pg Final     MCHC   Date Value Ref Range Status   11/15/2017 32.2 (L) 32.4 - 36.3 g/dL Final     RDW   Date Value Ref Range Status   11/15/2017 13.4 (H) 11.7 - 13.0 % Final     RDW-SD   Date Value Ref Range Status   11/15/2017 44.0 37.0 - 54.0 fl Final     MPV   Date Value Ref Range Status   11/15/2017 9.6 6.0 - 12.0 fL Final     Platelets   Date Value Ref Range Status   11/15/2017 283 140 - 500 10*3/mm3 Final     Neutrophil %   Date Value Ref Range Status   11/15/2017 63.2 42.7 - 76.0 % Final     Lymphocyte %   Date Value Ref Range Status   11/15/2017 29.4 19.6 - 45.3 % Final     Monocyte %   Date Value Ref Range Status   11/15/2017 4.3  (L) 5.0 - 12.0 % Final     Eosinophil %   Date Value Ref Range Status   11/15/2017 2.5 0.3 - 6.2 % Final     Basophil %   Date Value Ref Range Status   11/15/2017 0.4 0.0 - 1.5 % Final     Immature Grans %   Date Value Ref Range Status   11/15/2017 0.2 0.0 - 0.5 % Final     Neutrophils, Absolute   Date Value Ref Range Status   11/15/2017 5.76 1.90 - 8.10 10*3/mm3 Final     Lymphocytes, Absolute   Date Value Ref Range Status   11/15/2017 2.68 0.90 - 4.80 10*3/mm3 Final     Monocytes, Absolute   Date Value Ref Range Status   11/15/2017 0.39 0.20 - 1.20 10*3/mm3 Final     Eosinophils, Absolute   Date Value Ref Range Status   11/15/2017 0.23 0.00 - 0.70 10*3/mm3 Final     Basophils, Absolute   Date Value Ref Range Status   11/15/2017 0.04 0.00 - 0.20 10*3/mm3 Final     Immature Grans, Absolute   Date Value Ref Range Status   11/15/2017 0.02 0.00 - 0.03 10*3/mm3 Final       Lab Results   Component Value Date    GLUCOSE 182 (H) 11/15/2017    BUN 18 11/15/2017    CREATININE 0.63 11/15/2017    EGFRIFNONA 95 11/15/2017    BCR 28.6 (H) 11/15/2017    CO2 29.1 (H) 11/15/2017    CALCIUM 9.9 11/15/2017    ALBUMIN 4.30 11/15/2017    LABIL2 1.2 11/15/2017    AST 18 11/15/2017    ALT 26 11/15/2017         Imaging Results (last 7 days)     ** No results found for the last 168 hours. **            Assessment/Plan    1. Dysphagia: persists, no severe episodes    2. Epigastric pain: persists, cardiac workup normal to date    3. Ventricular bigeminy: mild diastolic dysfunction on her echo, normal stress echo.  She does have a strong family history of CAD    4. Transverse colon polyp: 2011    Plan  -EGD for further evaluation of her pain and dysphagia  -We'll add in colonoscopy at same time for history of colon polyp  -Pepcid twice a day when necessary for epigastric pain  -Further recommendations after her endoscopy  -Continued follow-up with cardiology    Anais was seen today for colon polyps and abdominal pain.    Diagnoses and all  orders for this visit:    Epigastric pain  -     Case Request; Standing  -     Implement Anesthesia Orders Day of Procedure; Standing  -     Obtain Informed Consent; Standing  -     lactated ringers infusion; Infuse 30 mL/hr into a venous catheter Continuous.  -     Case Request  -     famotidine (PEPCID) 20 MG tablet; Take 1 tablet by mouth 2 (Two) Times a Day As Needed for Indigestion or Heartburn.    Pharyngoesophageal dysphagia  -     Case Request; Standing  -     Implement Anesthesia Orders Day of Procedure; Standing  -     Obtain Informed Consent; Standing  -     lactated ringers infusion; Infuse 30 mL/hr into a venous catheter Continuous.  -     Case Request  -     famotidine (PEPCID) 20 MG tablet; Take 1 tablet by mouth 2 (Two) Times a Day As Needed for Indigestion or Heartburn.    Polyp of transverse colon, unspecified type  -     Case Request; Standing  -     Implement Anesthesia Orders Day of Procedure; Standing  -     Obtain Informed Consent; Standing  -     lactated ringers infusion; Infuse 30 mL/hr into a venous catheter Continuous.  -     Case Request        Dictated utilizing Dragon dictation

## 2018-04-23 NOTE — PATIENT INSTRUCTIONS
Schedule the EGD and colonoscopy    Further recommendations following scopes    For any additional questions, concerns or changes to your condition after today's office visit please contact the office at [phone].

## 2018-05-01 ENCOUNTER — ANESTHESIA (OUTPATIENT)
Dept: GASTROENTEROLOGY | Facility: HOSPITAL | Age: 68
End: 2018-05-01

## 2018-05-01 ENCOUNTER — HOSPITAL ENCOUNTER (OUTPATIENT)
Facility: HOSPITAL | Age: 68
Setting detail: HOSPITAL OUTPATIENT SURGERY
Discharge: HOME | End: 2018-05-01
Attending: INTERNAL MEDICINE | Admitting: INTERNAL MEDICINE

## 2018-05-01 ENCOUNTER — ANESTHESIA EVENT (OUTPATIENT)
Dept: GASTROENTEROLOGY | Facility: HOSPITAL | Age: 68
End: 2018-05-01

## 2018-05-01 VITALS
WEIGHT: 251.31 LBS | SYSTOLIC BLOOD PRESSURE: 127 MMHG | DIASTOLIC BLOOD PRESSURE: 70 MMHG | HEART RATE: 68 BPM | BODY MASS INDEX: 46.25 KG/M2 | HEIGHT: 62 IN | TEMPERATURE: 97.8 F | OXYGEN SATURATION: 99 % | RESPIRATION RATE: 16 BRPM

## 2018-05-01 DIAGNOSIS — K63.5 POLYP OF TRANSVERSE COLON, UNSPECIFIED TYPE: ICD-10-CM

## 2018-05-01 DIAGNOSIS — R10.13 EPIGASTRIC PAIN: ICD-10-CM

## 2018-05-01 LAB — GLUCOSE BLDC GLUCOMTR-MCNC: 172 MG/DL (ref 70–130)

## 2018-05-01 PROCEDURE — 25010000002 PROPOFOL 10 MG/ML EMULSION: Performed by: ANESTHESIOLOGY

## 2018-05-01 PROCEDURE — 88305 TISSUE EXAM BY PATHOLOGIST: CPT | Performed by: INTERNAL MEDICINE

## 2018-05-01 PROCEDURE — 82962 GLUCOSE BLOOD TEST: CPT

## 2018-05-01 PROCEDURE — 88312 SPECIAL STAINS GROUP 1: CPT | Performed by: INTERNAL MEDICINE

## 2018-05-01 PROCEDURE — 43450 DILATE ESOPHAGUS 1/MULT PASS: CPT | Performed by: INTERNAL MEDICINE

## 2018-05-01 PROCEDURE — 43239 EGD BIOPSY SINGLE/MULTIPLE: CPT | Performed by: INTERNAL MEDICINE

## 2018-05-01 PROCEDURE — 45380 COLONOSCOPY AND BIOPSY: CPT | Performed by: INTERNAL MEDICINE

## 2018-05-01 RX ORDER — LIDOCAINE HYDROCHLORIDE 20 MG/ML
INJECTION, SOLUTION INFILTRATION; PERINEURAL AS NEEDED
Status: DISCONTINUED | OUTPATIENT
Start: 2018-05-01 | End: 2018-05-01 | Stop reason: SURG

## 2018-05-01 RX ORDER — SODIUM CHLORIDE 0.9 % (FLUSH) 0.9 %
1-10 SYRINGE (ML) INJECTION AS NEEDED
Status: DISCONTINUED | OUTPATIENT
Start: 2018-05-01 | End: 2018-05-01 | Stop reason: HOSPADM

## 2018-05-01 RX ORDER — SODIUM CHLORIDE, SODIUM LACTATE, POTASSIUM CHLORIDE, CALCIUM CHLORIDE 600; 310; 30; 20 MG/100ML; MG/100ML; MG/100ML; MG/100ML
30 INJECTION, SOLUTION INTRAVENOUS CONTINUOUS
Status: DISCONTINUED | OUTPATIENT
Start: 2018-05-01 | End: 2018-05-01

## 2018-05-01 RX ORDER — PROPOFOL 10 MG/ML
VIAL (ML) INTRAVENOUS AS NEEDED
Status: DISCONTINUED | OUTPATIENT
Start: 2018-05-01 | End: 2018-05-01 | Stop reason: SURG

## 2018-05-01 RX ORDER — SODIUM CHLORIDE, SODIUM LACTATE, POTASSIUM CHLORIDE, CALCIUM CHLORIDE 600; 310; 30; 20 MG/100ML; MG/100ML; MG/100ML; MG/100ML
INJECTION, SOLUTION INTRAVENOUS CONTINUOUS PRN
Status: DISCONTINUED | OUTPATIENT
Start: 2018-05-01 | End: 2018-05-01 | Stop reason: SURG

## 2018-05-01 RX ORDER — SODIUM CHLORIDE 9 MG/ML
30 INJECTION, SOLUTION INTRAVENOUS CONTINUOUS PRN
Status: DISCONTINUED | OUTPATIENT
Start: 2018-05-01 | End: 2018-05-01 | Stop reason: HOSPADM

## 2018-05-01 RX ORDER — PROPOFOL 10 MG/ML
VIAL (ML) INTRAVENOUS CONTINUOUS PRN
Status: DISCONTINUED | OUTPATIENT
Start: 2018-05-01 | End: 2018-05-01 | Stop reason: SURG

## 2018-05-01 RX ADMIN — SODIUM CHLORIDE, POTASSIUM CHLORIDE, SODIUM LACTATE AND CALCIUM CHLORIDE: 600; 310; 30; 20 INJECTION, SOLUTION INTRAVENOUS at 11:05

## 2018-05-01 RX ADMIN — PROPOFOL 100 MCG/KG/MIN: 10 INJECTION, EMULSION INTRAVENOUS at 11:05

## 2018-05-01 RX ADMIN — SODIUM CHLORIDE 30 ML/HR: 9 INJECTION, SOLUTION INTRAVENOUS at 10:13

## 2018-05-01 RX ADMIN — PROPOFOL 150 MG: 10 INJECTION, EMULSION INTRAVENOUS at 11:05

## 2018-05-01 RX ADMIN — LIDOCAINE HYDROCHLORIDE 30 MG: 20 INJECTION, SOLUTION INFILTRATION; PERINEURAL at 11:05

## 2018-05-01 NOTE — ANESTHESIA PREPROCEDURE EVALUATION
Anesthesia Evaluation                  Airway   Mallampati: I  TM distance: >3 FB  Neck ROM: full  Dental - normal exam     Pulmonary    (+) shortness of breath, sleep apnea on CPAP,   Cardiovascular     (+) hypertension, dysrhythmias PVC, hyperlipidemia,       Neuro/Psych  GI/Hepatic/Renal/Endo    (+) morbid obesity, hiatal hernia,  diabetes mellitus,     Musculoskeletal     Abdominal    Substance History      OB/GYN          Other                      Anesthesia Plan    ASA 3     MAC     intravenous induction   Anesthetic plan and risks discussed with patient.      
35

## 2018-05-01 NOTE — ANESTHESIA POSTPROCEDURE EVALUATION
Patient: Anais West    Procedure Summary     Date:  05/01/18 Room / Location:  [facility] ENDOSCOPY 8 / [facility] ENDOSCOPY    Anesthesia Start:  1106 Anesthesia Stop:  1148    Procedures:       COLONOSCOPY TO CECUM & T.I. WITH COLD POLYPECTOMY (N/A )      ESOPHAGOGASTRODUODENOSCOPY WITH COLD BIOPSIES, VAZQUEZ DILATION 46FR (N/A Esophagus) Diagnosis:       Epigastric pain      Polyp of transverse colon, unspecified type      (Epigastric pain [R10.13])      (Polyp of transverse colon, unspecified type [D12.3])    Surgeon:  Radhika Marcum MD Provider:  Lorena Brar MD    Anesthesia Type:  MAC ASA Status:  3          Anesthesia Type: MAC  Last vitals  BP   127/70 (05/01/18 1208)   Temp   36.6 °C (97.8 °F) (05/01/18 1158)   Pulse   68 (05/01/18 1208)   Resp   16 (05/01/18 1208)     SpO2   99 % (05/01/18 1208)     Post Anesthesia Care and Evaluation    Patient location during evaluation: PACU  Patient participation: complete - patient participated  Level of consciousness: awake and alert  Pain management: adequate  Airway patency: patent  Anesthetic complications: No anesthetic complications  PONV Status: noneRespiratory status: acceptable  Hydration status: acceptable

## 2018-05-01 NOTE — DISCHARGE INSTRUCTIONS
Colonoscopy, Adult, Care After  DR MONIQUE [phone]  CALL OFFICE YOU HAVE NOT RECEIVED PATHOLOGY RESULTS IN 2 WEEKS    This sheet gives you information about how to care for yourself after your procedure. Your health care provider may also give you more specific instructions. If you have problems or questions, contact your health care provider.  What can I expect after the procedure?  After the procedure, it is common to have:  · A small amount of blood in your stool for 24 hours after the procedure.  · Some gas.  · Mild abdominal cramping or bloating.  Follow these instructions at home:  General instructions     · For the first 24 hours after the procedure:  ¨ Do not drive or use machinery.  ¨ Do not sign important documents.  ¨ Do not drink alcohol.  ¨ Do your regular daily activities at a slower pace than normal.  ¨ Eat soft, easy-to-digest foods.  ¨ Rest often.  · Take over-the-counter or prescription medicines only as told by your health care provider.  · It is up to you to get the results of your procedure. Ask your health care provider, or the department performing the procedure, when your results will be ready.  Relieving cramping and bloating   · Try walking around when you have cramps or feel bloated.  · Apply heat to your abdomen as told by your health care provider. Use a heat source that your health care provider recommends, such as a moist heat pack or a heating pad.  ¨ Place a towel between your skin and the heat source.  ¨ Leave the heat on for 20-30 minutes.  ¨ Remove the heat if your skin turns bright red. This is especially important if you are unable to feel pain, heat, or cold. You may have a greater risk of getting burned.  Eating and drinking   · Drink enough fluid to keep your urine clear or pale yellow.  · Resume your normal diet as instructed by your health care provider. Avoid heavy or fried foods that are hard to digest.  · Avoid drinking alcohol for as long as instructed by your health  care provider.  Contact a health care provider if:  · You have blood in your stool 2-3 days after the procedure.  Get help right away if:  · You have more than a small spotting of blood in your stool.  · You pass large blood clots in your stool.  · Your abdomen is swollen.  · You have nausea or vomiting.  · You have a fever.  · You have increasing abdominal pain that is not relieved with medicine.  This information is not intended to replace advice given to you by your health care provider. Make sure you discuss any questions you have with your health care provider.  Document Released: 08/01/2005 Document Revised: 09/11/2017 Document Reviewed: 02/28/2017  [employer] Interactive Patient Education © 2017 [employer]  Esophagogastroduodenoscopy, Care After  DR MONIQUE [phone]  IF YOU HAVE NOT RECEIVED PATHOLOGY RESULTS IN 2 WEEKS PLEASE CALL OFFICE  Refer to this sheet in the next few weeks. These instructions provide you with information about caring for yourself after your procedure. Your health care provider may also give you more specific instructions. Your treatment has been planned according to current medical practices, but problems sometimes occur. Call your health care provider if you have any problems or questions after your procedure.  What can I expect after the procedure?  After the procedure, it is common to have:  · A sore throat.  · Nausea.  · Bloating.  · Dizziness.  · Fatigue.  Follow these instructions at home:    · Do not drive for 24 hours if you received a medicine to help you relax (sedative).  · If your health care provider took a tissue sample for testing during the procedure, make sure to get your test results. This is your responsibility. Ask your health care provider or the department performing the test when your results will be ready.  · Keep all follow-up visits as told by your health care provider. This is important.  Contact a health care provider if:  · You cannot stop coughing.  · You  are not urinating.  · You are urinating less than usual.  Get help right away if:  · You have trouble swallowing.  · You cannot eat or drink.  · You have throat or chest pain that gets worse.  · You are dizzy or light-headed.  · You faint.  · You have nausea or vomiting.  · You have chills.  · You have a fever.  · You have severe abdominal pain.  · You have black, tarry, or bloody stools.  This information is not intended to replace advice given to you by your health care provider. Make sure you discuss any questions you have with your health care provider.  Document Released: 12/04/2013 Document Revised: 05/25/2017 Document Reviewed: 11/10/2016  [employer] Interactive Patient Education © 2017 [employer]

## 2018-05-02 ENCOUNTER — TELEPHONE (OUTPATIENT)
Dept: GASTROENTEROLOGY | Facility: CLINIC | Age: 68
End: 2018-05-02

## 2018-05-02 LAB
CYTO UR: NORMAL
LAB AP CASE REPORT: NORMAL
Lab: NORMAL
PATH REPORT.FINAL DX SPEC: NORMAL
PATH REPORT.GROSS SPEC: NORMAL

## 2018-05-02 NOTE — TELEPHONE ENCOUNTER
I would either have her see her PCP or swing by the office noonish tomorrow for me to take a quick peek at it if it is not better by tomorrow.

## 2018-05-02 NOTE — TELEPHONE ENCOUNTER
Called pt and pt reports that she has a swelling on the left side of her neck. She states that is the size of an egg.  It is tender to touch and is a little warm.  Pt denies a fever and denies any difficulty swallowing or breathing.  Pt is eating without difficulty. Pt is asking if this is normal.  Advised pt we do not normally see this . Advised would send message to Dr Marcum and in the meantime if she has difficulty swallowing or breathing to [facility].  Pt verb understanding.

## 2018-05-02 NOTE — TELEPHONE ENCOUNTER
----- Message from Tanisha Arreguin sent at 5/2/2018  2:31 PM EDT -----  Regarding: Pt called   Contact: [phone]  Pt called with some questions after her scope yesterday. Pt would like a call back.

## 2018-05-03 NOTE — TELEPHONE ENCOUNTER
Call to pt.  Advise per Dr Marcum that would either see PCP or come by this office today around noon for Dr Marcum to look at if not better.    Pt verb understanding.  States swelling has resolved.    Update to DR Marcum.

## 2018-05-03 NOTE — PROGRESS NOTES
The biopsies of her tract all were normal.  I recommend that she continue the Pepcid.  We can see how she does after the dilation.    Polyps removed from her colon were all tubular adenomas.  In absence of symptoms, her next colonoscopy should be in 3 years.  Please place in recall.    Follow-up with me in office in 3 months.

## 2018-05-16 ENCOUNTER — TELEPHONE (OUTPATIENT)
Dept: GASTROENTEROLOGY | Facility: CLINIC | Age: 68
End: 2018-05-16

## 2018-05-16 NOTE — TELEPHONE ENCOUNTER
Call to pt.  Advise per DR Marcum that abx of tract all were normal.  Recommend continue Pepcid. Will see how does after the dilation.    Polyps removed from colon were all tubular adenomas (NOT cancerous, but precancerous).  In absence of symptoms, next c/s should be in 3 yrs.      F/u in office in 3 mo's. Pt verb understanding.  Appt scheduled with Dr Marcum for 8/10/18 @ 2:30.    C/s for 5/1/21 placed in recall.

## 2018-05-16 NOTE — TELEPHONE ENCOUNTER
----- Message from Nuvia Coburn sent at 5/16/2018  8:06 AM EDT -----  Regarding: PT CALLED - PATH RESULTS  Contact: [phone]  PT HAD TEST 2 WKS AGO.

## 2018-05-16 NOTE — TELEPHONE ENCOUNTER
----- Message from Radhika Marcum MD sent at 5/3/2018  1:51 PM EDT -----  The biopsies of her tract all were normal.  I recommend that she continue the Pepcid.  We can see how she does after the dilation.    Polyps removed from her colon were all tubular adenomas.  In absence of symptoms, her next colonoscopy should be in 3 years.  Please place in recall.    Follow-up with me in office in 3 months.

## 2018-05-25 ENCOUNTER — HOSPITAL ENCOUNTER (OUTPATIENT)
Dept: FAMILY MEDICINE CLINIC | Facility: CLINIC | Age: 68
Setting detail: SPECIMEN
Discharge: HOME | End: 2018-05-25
Attending: FAMILY MEDICINE | Admitting: FAMILY MEDICINE

## 2018-05-25 LAB
BACTERIA SPEC AEROBE CULT: NORMAL
BILIRUB UR QL STRIP: NEGATIVE MG/DL
CASTS URNS QL MICRO: ABNORMAL /[LPF]
COLOR UR: YELLOW
CONV BACTERIA IN URINE MICRO: ABNORMAL
CONV CLARITY OF URINE: ABNORMAL
CONV HYALINE CASTS IN URINE MICRO: 4 /[LPF] (ref 0–5)
CONV MICROALBUM.,U,RANDOM: 6 MG/L
CONV PROTEIN IN URINE BY AUTOMATED TEST STRIP: NEGATIVE MG/DL
CONV SMALL ROUND CELLS: ABNORMAL /[HPF]
CONV UROBILINOGEN IN URINE BY AUTOMATED TEST STRIP: 0.2 MG/DL
CULTURE INDICATED?: ABNORMAL
GLUCOSE UR QL: NEGATIVE MG/DL
HGB UR QL STRIP: NEGATIVE
KETONES UR QL STRIP: NEGATIVE MG/DL
LEUKOCYTE ESTERASE UR QL STRIP: NEGATIVE
Lab: NORMAL
MICRO REPORT STATUS: NORMAL
NITRITE UR QL STRIP: NEGATIVE
PH UR STRIP.AUTO: 7 [PH] (ref 4.5–8)
RBC #/AREA URNS HPF: 1 /[HPF] (ref 0–3)
SP GR UR: 1.02 (ref 1–1.03)
SPECIMEN SOURCE: NORMAL
SPERM URNS QL MICRO: ABNORMAL /[HPF]
SQUAMOUS SPT QL MICRO: 4 /[HPF] (ref 0–5)
UNIDENT CRYS URNS QL MICRO: ABNORMAL /[HPF]
WBC #/AREA URNS HPF: 2 /[HPF] (ref 0–5)
YEAST SPEC QL WET PREP: ABNORMAL /[HPF]

## 2018-07-03 ENCOUNTER — OFFICE VISIT (OUTPATIENT)
Dept: CARDIOLOGY | Facility: CLINIC | Age: 68
End: 2018-07-03

## 2018-07-03 VITALS
HEIGHT: 62 IN | BODY MASS INDEX: 46.48 KG/M2 | DIASTOLIC BLOOD PRESSURE: 90 MMHG | HEART RATE: 85 BPM | SYSTOLIC BLOOD PRESSURE: 140 MMHG | OXYGEN SATURATION: 98 % | WEIGHT: 252.6 LBS

## 2018-07-03 DIAGNOSIS — I10 ESSENTIAL HYPERTENSION: Primary | ICD-10-CM

## 2018-07-03 DIAGNOSIS — I49.3 PVC (PREMATURE VENTRICULAR CONTRACTION): ICD-10-CM

## 2018-07-03 PROCEDURE — 99213 OFFICE O/P EST LOW 20 MIN: CPT | Performed by: INTERNAL MEDICINE

## 2018-07-03 NOTE — PROGRESS NOTES
Subjective:     Encounter Date:07/03/2018      Patient ID: Anais West is a 67 y.o. female.    Chief Complaint:PVCs.  History of Present Illness    67-year-old female who presents today for reevaluation.  During her routine colonoscopy she was found to be in bigeminy and was referred to our office.  Patient was worked up with a stress echocardiogram that was unremarkable.  She presents today for reassessment doing well.  Blood pressure is slightly elevated no palpitations shortness of breath edema chest pain or lightheadedness.      Review of Systems   All other systems reviewed and are negative.      Procedures       Objective:     Physical Exam   Constitutional: She is oriented to person, place, and time. She appears well-developed.   HENT:   Head: Normocephalic.   Eyes: Conjunctivae are normal.   Neck: Normal range of motion.   Cardiovascular: Normal rate, regular rhythm and normal heart sounds.    Pulmonary/Chest: Breath sounds normal.   Abdominal: Soft. Bowel sounds are normal.   Musculoskeletal: Normal range of motion. She exhibits no edema.   Neurological: She is alert and oriented to person, place, and time.   Skin: Skin is warm and dry.   Psychiatric: She has a normal mood and affect. Her behavior is normal.   Vitals reviewed.      Lab Review:       Assessment:          Diagnosis Plan   1. Essential hypertension     2. PVC (premature ventricular contraction)            Plan:       1.  Hypertension blood pressure is borderline told her continue to follow it.  2.  PVCs stable  3.  Follow-up one year sooner if issues

## 2018-08-31 ENCOUNTER — OFFICE VISIT (OUTPATIENT)
Dept: GASTROENTEROLOGY | Facility: CLINIC | Age: 68
End: 2018-08-31

## 2018-08-31 VITALS
SYSTOLIC BLOOD PRESSURE: 118 MMHG | WEIGHT: 252 LBS | HEIGHT: 62 IN | TEMPERATURE: 98.8 F | BODY MASS INDEX: 46.38 KG/M2 | DIASTOLIC BLOOD PRESSURE: 80 MMHG

## 2018-08-31 DIAGNOSIS — K59.00 CONSTIPATION, UNSPECIFIED CONSTIPATION TYPE: ICD-10-CM

## 2018-08-31 DIAGNOSIS — R10.13 EPIGASTRIC PAIN: Primary | ICD-10-CM

## 2018-08-31 DIAGNOSIS — R13.14 PHARYNGOESOPHAGEAL DYSPHAGIA: ICD-10-CM

## 2018-08-31 DIAGNOSIS — R07.89 XIPHOID PAIN: ICD-10-CM

## 2018-08-31 PROCEDURE — 99214 OFFICE O/P EST MOD 30 MIN: CPT | Performed by: INTERNAL MEDICINE

## 2018-11-13 ENCOUNTER — HOSPITAL ENCOUNTER (OUTPATIENT)
Dept: FAMILY MEDICINE CLINIC | Facility: CLINIC | Age: 68
Setting detail: SPECIMEN
Discharge: HOME | End: 2018-11-13
Attending: FAMILY MEDICINE | Admitting: FAMILY MEDICINE

## 2018-11-13 LAB
ALBUMIN SERPL-MCNC: 3.4 G/DL (ref 3.5–4.8)
ALBUMIN/GLOB SERPL: 1.1 {RATIO} (ref 1–1.7)
ALP SERPL-CCNC: 82 IU/L (ref 32–91)
ALT SERPL-CCNC: 35 IU/L (ref 14–54)
ANION GAP SERPL CALC-SCNC: 13.4 MMOL/L (ref 10–20)
AST SERPL-CCNC: 27 IU/L (ref 15–41)
BASOPHILS # BLD AUTO: 0.1 10*3/UL (ref 0–0.2)
BASOPHILS NFR BLD AUTO: 1 % (ref 0–2)
BILIRUB SERPL-MCNC: 0.9 MG/DL (ref 0.3–1.2)
BUN SERPL-MCNC: 13 MG/DL (ref 8–20)
BUN/CREAT SERPL: 18.6 (ref 5.4–26.2)
CALCIUM SERPL-MCNC: 8.9 MG/DL (ref 8.9–10.3)
CHLORIDE SERPL-SCNC: 96 MMOL/L (ref 101–111)
CHOLEST SERPL-MCNC: 149 MG/DL
CHOLEST/HDLC SERPL: 3 {RATIO}
CONV CO2: 30 MMOL/L (ref 22–32)
CONV LDL CHOLESTEROL DIRECT: 91 MG/DL (ref 0–100)
CONV TOTAL PROTEIN: 6.6 G/DL (ref 6.1–7.9)
CREAT UR-MCNC: 0.7 MG/DL (ref 0.4–1)
DIFFERENTIAL METHOD BLD: (no result)
EOSINOPHIL # BLD AUTO: 0.2 10*3/UL (ref 0–0.3)
EOSINOPHIL # BLD AUTO: 3 % (ref 0–3)
ERYTHROCYTE [DISTWIDTH] IN BLOOD BY AUTOMATED COUNT: 14.5 % (ref 11.5–14.5)
GLOBULIN UR ELPH-MCNC: 3.2 G/DL (ref 2.5–3.8)
GLUCOSE SERPL-MCNC: 219 MG/DL (ref 65–99)
HCT VFR BLD AUTO: 37.5 % (ref 35–49)
HDLC SERPL-MCNC: 49 MG/DL
HGB BLD-MCNC: 12.8 G/DL (ref 12–15)
LDLC/HDLC SERPL: 1.9 {RATIO}
LIPID INTERPRETATION: NORMAL
LYMPHOCYTES # BLD AUTO: 1.6 10*3/UL (ref 0.8–4.8)
LYMPHOCYTES NFR BLD AUTO: 21 % (ref 18–42)
MCH RBC QN AUTO: 29.3 PG (ref 26–32)
MCHC RBC AUTO-ENTMCNC: 34.1 G/DL (ref 32–36)
MCV RBC AUTO: 85.8 FL (ref 80–94)
MONOCYTES # BLD AUTO: 0.5 10*3/UL (ref 0.1–1.3)
MONOCYTES NFR BLD AUTO: 6 % (ref 2–11)
NEUTROPHILS # BLD AUTO: 5.2 10*3/UL (ref 2.3–8.6)
NEUTROPHILS NFR BLD AUTO: 69 % (ref 50–75)
NRBC BLD AUTO-RTO: 0 /100{WBCS}
NRBC/RBC NFR BLD MANUAL: 0 10*3/UL
PLATELET # BLD AUTO: 265 10*3/UL (ref 150–450)
PMV BLD AUTO: 8.6 FL (ref 7.4–10.4)
POTASSIUM SERPL-SCNC: 3.4 MMOL/L (ref 3.6–5.1)
RBC # BLD AUTO: 4.38 10*6/UL (ref 4–5.4)
SODIUM SERPL-SCNC: 136 MMOL/L (ref 136–144)
TRIGL SERPL-MCNC: 141 MG/DL
VLDLC SERPL CALC-MCNC: 8.6 MG/DL
WBC # BLD AUTO: 7.6 10*3/UL (ref 4.5–11.5)

## 2019-02-18 ENCOUNTER — HOSPITAL ENCOUNTER (OUTPATIENT)
Dept: FAMILY MEDICINE CLINIC | Facility: CLINIC | Age: 69
Setting detail: SPECIMEN
Discharge: HOME | End: 2019-02-18
Attending: FAMILY MEDICINE | Admitting: FAMILY MEDICINE

## 2019-02-18 LAB
ALBUMIN SERPL-MCNC: 3.5 G/DL (ref 3.5–4.8)
ALBUMIN/GLOB SERPL: 1.1 {RATIO} (ref 1–1.7)
ALP SERPL-CCNC: 81 IU/L (ref 32–91)
ALT SERPL-CCNC: 51 IU/L (ref 14–54)
ANION GAP SERPL CALC-SCNC: 14.2 MMOL/L (ref 10–20)
AST SERPL-CCNC: 39 IU/L (ref 15–41)
BILIRUB SERPL-MCNC: 0.6 MG/DL (ref 0.3–1.2)
BUN SERPL-MCNC: 13 MG/DL (ref 8–20)
BUN/CREAT SERPL: 16.3 (ref 5.4–26.2)
CALCIUM SERPL-MCNC: 9.1 MG/DL (ref 8.9–10.3)
CHLORIDE SERPL-SCNC: 96 MMOL/L (ref 101–111)
CONV CO2: 28 MMOL/L (ref 22–32)
CONV TOTAL PROTEIN: 6.6 G/DL (ref 6.1–7.9)
CREAT UR-MCNC: 0.8 MG/DL (ref 0.4–1)
GLOBULIN UR ELPH-MCNC: 3.1 G/DL (ref 2.5–3.8)
GLUCOSE SERPL-MCNC: 189 MG/DL (ref 65–99)
POTASSIUM SERPL-SCNC: 3.2 MMOL/L (ref 3.6–5.1)
SODIUM SERPL-SCNC: 135 MMOL/L (ref 136–144)

## 2019-05-20 ENCOUNTER — HOSPITAL ENCOUNTER (OUTPATIENT)
Dept: FAMILY MEDICINE CLINIC | Facility: CLINIC | Age: 69
Setting detail: SPECIMEN
Discharge: HOME | End: 2019-05-20
Attending: FAMILY MEDICINE | Admitting: FAMILY MEDICINE

## 2019-05-20 LAB — HBA1C MFR BLD: 6.8 % (ref 0–5.6)

## 2019-07-30 RX ORDER — SAXAGLIPTIN 5 MG/1
TABLET, FILM COATED ORAL
Qty: 90 TABLET | Refills: 1 | Status: SHIPPED | OUTPATIENT
Start: 2019-07-30 | End: 2020-01-09

## 2019-08-19 ENCOUNTER — OFFICE VISIT (OUTPATIENT)
Dept: FAMILY MEDICINE CLINIC | Facility: CLINIC | Age: 69
End: 2019-08-19

## 2019-08-19 VITALS
HEART RATE: 89 BPM | BODY MASS INDEX: 44.75 KG/M2 | SYSTOLIC BLOOD PRESSURE: 118 MMHG | WEIGHT: 237 LBS | TEMPERATURE: 98.2 F | HEIGHT: 61 IN | DIASTOLIC BLOOD PRESSURE: 62 MMHG | OXYGEN SATURATION: 95 %

## 2019-08-19 DIAGNOSIS — F41.9 ANXIETY: Primary | ICD-10-CM

## 2019-08-19 DIAGNOSIS — F32.A DEPRESSION, UNSPECIFIED DEPRESSION TYPE: ICD-10-CM

## 2019-08-19 PROCEDURE — 99213 OFFICE O/P EST LOW 20 MIN: CPT | Performed by: FAMILY MEDICINE

## 2019-08-19 RX ORDER — BLOOD-GLUCOSE METER
EACH MISCELLANEOUS
COMMUNITY
Start: 2014-03-21

## 2019-08-19 RX ORDER — ESCITALOPRAM OXALATE 10 MG/1
10 TABLET ORAL DAILY
Qty: 30 TABLET | Refills: 2 | Status: SHIPPED | OUTPATIENT
Start: 2019-08-19 | End: 2019-09-11 | Stop reason: SDUPTHER

## 2019-08-19 NOTE — PROGRESS NOTES
Subjective   Chief Complaint   Patient presents with   • Anxiety     med not helping anymore   • Depression     Anais West is a 69 y.o. female.     Anxiety   Presents for initial visit. Onset was 1 to 6 months ago. The problem has been gradually worsening. Symptoms include depressed mood, irritability and restlessness. Patient reports no chest pain, confusion, decreased concentration, dizziness, insomnia, palpitations, shortness of breath or suicidal ideas. Symptoms occur most days. The severity of symptoms is causing significant distress.     Her past medical history is significant for depression. Past treatments include SSRIs. The treatment provided mild relief. Compliance with prior treatments has been good.   Depression Patient presents with the following symptoms: depressed mood, irritability and restlessness.  Patient is not experiencing: confusion, decreased concentration, insomnia, palpitations, shortness of breath and suicidal ideas.       Past Medical History:   Diagnosis Date   • Abdominal pain    • Anxiety    • Cholelithiasis 1976    surgery   • Depression    • Diabetes mellitus (CMS/HCC)    • QUIÑONES (dyspnea on exertion)    • Excessive daytime sleepiness    • Heat intolerance    • Hiatal hernia    • History of transfusion    • Hyperlipidemia    • Hypertension    • Joint pain    • Malaise and fatigue    • Myalgia    • Nervously anxious    • Sleep apnea     CPAP   • SOB (shortness of breath)    • Ventricular bigeminy 11/2017     Past Surgical History:   Procedure Laterality Date   • CHOLECYSTECTOMY     • COLONOSCOPY  06/10/2011    One 7 mm polyp in the transverse colon, non-bleeding internal hemorrhoids   • COLONOSCOPY N/A 5/1/2018    IH, polyps (TAs)   • ENDOSCOPY N/A 5/1/2018    Gastritis   • HYSTERECTOMY     • UPPER GASTROINTESTINAL ENDOSCOPY  06/10/2011    Hiatus hernia, gastric mucosal abnormality characterized by erythema     Allergies   Allergen Reactions   • Penicillins Anaphylaxis   • Sulfa  Antibiotics Hives     Social History     Socioeconomic History   • Marital status:      Spouse name: Not on file   • Number of children: Not on file   • Years of education: Not on file   • Highest education level: Not on file   Tobacco Use   • Smoking status: Never Smoker   • Smokeless tobacco: Never Used   Substance and Sexual Activity   • Alcohol use: No     Comment: Daily caffeine use   • Drug use: No   • Sexual activity: Defer     Partners: Male     Birth control/protection: Post-menopausal     Social History     Tobacco Use   Smoking Status Never Smoker   Smokeless Tobacco Never Used       family history includes Atrial fibrillation in her mother; Heart attack in her brother; Heart disease in her brother, brother, father, and maternal grandmother.  Current Outpatient Medications on File Prior to Visit   Medication Sig Dispense Refill   • metFORMIN (GLUCOPHAGE) 1000 MG tablet Take 1,000 mg by mouth 2 (Two) Times a Day With Meals.  0   • Unable to find TAKE ONE (1) CAPSULE BY MOUTH EVERY NIGHT AT BEDTIME AS DIRECTED  4   • Unable to find APPLY ONE-HALF (1/2) MILLILITER (ML) TOPICALLY DAILY  2   • Unable to find TAKE ONE (1) CAPSULE BY MOUTH EVERY NIGHT AT BEDTIME AS DIRECTED  4   • atorvastatin (LIPITOR) 40 MG tablet take 1 po daily  5   • Blood Glucose Monitoring Suppl (ACCU-CHEK MEAGAN PLUS) w/Device kit ACCU-CHEK MEAGAN PLUS w/Device KIT     • chlorthalidone (HYGROTON) 25 MG tablet take 1 po daily  4   • glucose blood (ACCU-CHEK MEAGAN) test strip ACCU-CHEK MEAGAN IN VITRO STRIP     • losartan (COZAAR) 100 MG tablet take 1 po daily  11   • metoprolol tartrate (LOPRESSOR) 25 MG tablet take 1 po twice daily  5   • ONGLYZA 5 MG tablet TAKE 1 TABLET BY MOUTH IN THE MORNING 90 tablet 1   • Unable to find TAKE ONE (1) CAPSULE BY MOUTH EVERY NIGHT AT BEDTIME AS DIRECTED  4   • [DISCONTINUED] famotidine (PEPCID) 20 MG tablet Take 1 tablet by mouth 2 (Two) Times a Day As Needed for Indigestion or Heartburn. 60  "tablet 2   • [DISCONTINUED] sertraline (ZOLOFT) 50 MG tablet take 1 po twice daily  1     No current facility-administered medications on file prior to visit.      Patient Active Problem List   Diagnosis   • Epigastric pain   • Pharyngoesophageal dysphagia   • Polyp of transverse colon   • Hypertension   • Constipation   • Xiphoid pain   • Depression   • Anxiety       The following portions of the patient's history were reviewed and updated as appropriate: allergies, current medications, past family history, past medical history, past social history, past surgical history and problem list.    Review of Systems   Constitutional: Positive for irritability. Negative for chills and fever.   HENT: Negative for sinus pressure and sore throat.    Eyes: Negative for blurred vision.   Respiratory: Negative for cough and shortness of breath.    Cardiovascular: Negative for chest pain and palpitations.   Gastrointestinal: Negative for abdominal pain.   Endocrine: Negative for polyuria.   Skin: Negative for rash.   Neurological: Negative for dizziness, headache and confusion.   Hematological: Negative for adenopathy.   Psychiatric/Behavioral: Positive for depressed mood and stress. Negative for decreased concentration and suicidal ideas. The patient does not have insomnia.        Objective   /62 (BP Location: Left arm, Patient Position: Sitting, Cuff Size: Adult)   Pulse 89   Temp 98.2 °F (36.8 °C) (Oral)   Ht 153.7 cm (60.5\")   Wt 108 kg (237 lb)   SpO2 95%   BMI 45.52 kg/m²   Physical Exam   Constitutional: She is oriented to person, place, and time. She appears well-developed. No distress.   HENT:   Head: Normocephalic.   Eyes: Conjunctivae and lids are normal.   Neck: Trachea normal. No thyroid mass and no thyromegaly present.   Cardiovascular: Normal rate, regular rhythm and normal heart sounds.   Pulmonary/Chest: Effort normal and breath sounds normal.   Lymphadenopathy:     She has no cervical adenopathy. "   Neurological: She is alert and oriented to person, place, and time.   Skin: Skin is warm and dry.   Psychiatric: Her speech is normal and behavior is normal. Her mood appears anxious. She is attentive.       No visits with results within 1 Week(s) from this visit.   Latest known visit with results is:   Hospital Outpatient Visit on 05/20/2019   Component Date Value Ref Range Status   • Hemoglobin A1C 05/20/2019 6.8* 0 - 5.6 % Final    Comment: (SEE BELOW)       Range          Diabetic Status  __________________________________________      <5.7 %              Normal   5.7 - 6.4 %   Increased risk for Diabetes      >6.4 %             Diabetes    These guidelines have been recommended by the  American Diabetic Association for Hgb A1c.             Assessment/Plan   Problems Addressed this Visit        Other    Depression    Relevant Medications    escitalopram (LEXAPRO) 10 MG tablet    Anxiety - Primary          Anais was seen today for anxiety and depression.    Diagnoses and all orders for this visit:    Anxiety    Depression, unspecified depression type    Other orders  -     escitalopram (LEXAPRO) 10 MG tablet; Take 1 tablet by mouth Daily.

## 2019-09-11 RX ORDER — ESCITALOPRAM OXALATE 10 MG/1
TABLET ORAL
Qty: 30 TABLET | Refills: 2 | Status: SHIPPED | OUTPATIENT
Start: 2019-09-11 | End: 2019-12-08 | Stop reason: SDUPTHER

## 2019-11-20 DIAGNOSIS — E78.5 HYPERLIPIDEMIA, UNSPECIFIED HYPERLIPIDEMIA TYPE: ICD-10-CM

## 2019-11-20 DIAGNOSIS — E11.9 TYPE 2 DIABETES MELLITUS WITHOUT COMPLICATION, WITHOUT LONG-TERM CURRENT USE OF INSULIN (HCC): Primary | ICD-10-CM

## 2019-11-20 LAB
ALBUMIN SERPL-MCNC: 4.3 G/DL (ref 3.5–5.2)
ALBUMIN/GLOB SERPL: 1.3 G/DL
ALP SERPL-CCNC: 69 U/L (ref 39–117)
ALT SERPL W P-5'-P-CCNC: 57 U/L (ref 1–33)
ANION GAP SERPL CALCULATED.3IONS-SCNC: 15.1 MMOL/L (ref 5–15)
AST SERPL-CCNC: 42 U/L (ref 1–32)
BILIRUB SERPL-MCNC: 0.6 MG/DL (ref 0.2–1.2)
BUN BLD-MCNC: 16 MG/DL (ref 8–23)
BUN/CREAT SERPL: 23.9 (ref 7–25)
CALCIUM SPEC-SCNC: 9.7 MG/DL (ref 8.6–10.5)
CHLORIDE SERPL-SCNC: 98 MMOL/L (ref 98–107)
CHOLEST SERPL-MCNC: 237 MG/DL (ref 0–200)
CO2 SERPL-SCNC: 27.9 MMOL/L (ref 22–29)
CREAT BLD-MCNC: 0.67 MG/DL (ref 0.57–1)
GFR SERPL CREATININE-BSD FRML MDRD: 88 ML/MIN/1.73
GLOBULIN UR ELPH-MCNC: 3.3 GM/DL
GLUCOSE BLD-MCNC: 148 MG/DL (ref 65–99)
HBA1C MFR BLD: 6.6 % (ref 3.5–5.6)
HDLC SERPL-MCNC: 49 MG/DL (ref 40–60)
LDLC SERPL CALC-MCNC: 152 MG/DL (ref 0–100)
LDLC/HDLC SERPL: 3.11 {RATIO}
POTASSIUM BLD-SCNC: 3.9 MMOL/L (ref 3.5–5.2)
PROT SERPL-MCNC: 7.6 G/DL (ref 6–8.5)
SODIUM BLD-SCNC: 141 MMOL/L (ref 136–145)
TRIGL SERPL-MCNC: 179 MG/DL (ref 0–150)
VLDLC SERPL-MCNC: 35.8 MG/DL (ref 5–40)

## 2019-11-20 PROCEDURE — 83036 HEMOGLOBIN GLYCOSYLATED A1C: CPT | Performed by: FAMILY MEDICINE

## 2019-11-20 PROCEDURE — 80061 LIPID PANEL: CPT | Performed by: FAMILY MEDICINE

## 2019-11-20 PROCEDURE — 80053 COMPREHEN METABOLIC PANEL: CPT | Performed by: FAMILY MEDICINE

## 2019-11-20 PROCEDURE — 36415 COLL VENOUS BLD VENIPUNCTURE: CPT | Performed by: FAMILY MEDICINE

## 2019-11-26 ENCOUNTER — OFFICE VISIT (OUTPATIENT)
Dept: FAMILY MEDICINE CLINIC | Facility: CLINIC | Age: 69
End: 2019-11-26

## 2019-11-26 VITALS
DIASTOLIC BLOOD PRESSURE: 58 MMHG | WEIGHT: 241 LBS | TEMPERATURE: 98 F | SYSTOLIC BLOOD PRESSURE: 130 MMHG | HEART RATE: 42 BPM | OXYGEN SATURATION: 95 % | BODY MASS INDEX: 46.29 KG/M2

## 2019-11-26 DIAGNOSIS — Z23 NEED FOR VACCINATION: ICD-10-CM

## 2019-11-26 DIAGNOSIS — E78.5 HYPERLIPIDEMIA, UNSPECIFIED HYPERLIPIDEMIA TYPE: ICD-10-CM

## 2019-11-26 DIAGNOSIS — I10 ESSENTIAL HYPERTENSION: ICD-10-CM

## 2019-11-26 DIAGNOSIS — E11.65 TYPE 2 DIABETES MELLITUS WITH HYPERGLYCEMIA, WITHOUT LONG-TERM CURRENT USE OF INSULIN (HCC): Primary | ICD-10-CM

## 2019-11-26 PROCEDURE — 90471 IMMUNIZATION ADMIN: CPT | Performed by: FAMILY MEDICINE

## 2019-11-26 PROCEDURE — 99214 OFFICE O/P EST MOD 30 MIN: CPT | Performed by: FAMILY MEDICINE

## 2019-11-26 PROCEDURE — 90674 CCIIV4 VAC NO PRSV 0.5 ML IM: CPT | Performed by: FAMILY MEDICINE

## 2019-11-26 NOTE — PROGRESS NOTES
Subjective   Chief Complaint   Patient presents with   • Diabetes     Anais West is a 69 y.o. female.     Diabetes   She presents for her follow-up diabetic visit. She has type 2 diabetes mellitus. The initial diagnosis of diabetes was made 10 years ago. Her disease course has been improving. There are no hypoglycemic associated symptoms. Pertinent negatives for hypoglycemia include no dizziness. Pertinent negatives for diabetes include no blurred vision, no chest pain, no fatigue, no foot paresthesias, no foot ulcerations, no polydipsia, no polyuria and no visual change. There are no hypoglycemic complications. Symptoms are stable. There are no diabetic complications. Current diabetic treatment includes oral agent (dual therapy). She is compliant with treatment all of the time. She is following a diabetic diet. She participates in exercise intermittently. Her home blood glucose trend is decreasing steadily. An ACE inhibitor/angiotensin II receptor blocker is being taken. Eye exam is current.   Hypertension   This is a chronic problem. The current episode started more than 1 year ago. The problem is unchanged. The problem is controlled. Pertinent negatives include no blurred vision, chest pain, palpitations or shortness of breath. Current antihypertension treatment includes alpha 1 blockers, beta blockers and diuretics. The current treatment provides significant improvement. There are no compliance problems.    Hyperlipidemia   This is a chronic problem. The current episode started more than 1 year ago. The problem is uncontrolled. Recent lipid tests were reviewed and are variable. Pertinent negatives include no chest pain or shortness of breath. Current antihyperlipidemic treatment includes statins. The current treatment provides moderate improvement of lipids. There are no compliance problems.       Past Medical History:   Diagnosis Date   • Abdominal pain    • Anxiety    • Cholelithiasis 1976    surgery   •  Depression    • Diabetes mellitus (CMS/HCC)    • QUIÑONES (dyspnea on exertion)    • Excessive daytime sleepiness    • Heat intolerance    • Hiatal hernia    • History of transfusion    • Hyperlipidemia    • Hypertension    • Joint pain    • Malaise and fatigue    • Myalgia    • Nervously anxious    • Sleep apnea     CPAP   • SOB (shortness of breath)    • Ventricular bigeminy 11/2017     Past Surgical History:   Procedure Laterality Date   • CHOLECYSTECTOMY     • COLONOSCOPY  06/10/2011    One 7 mm polyp in the transverse colon, non-bleeding internal hemorrhoids   • COLONOSCOPY N/A 5/1/2018    IH, polyps (TAs)   • ENDOSCOPY N/A 5/1/2018    Gastritis   • HYSTERECTOMY     • UPPER GASTROINTESTINAL ENDOSCOPY  06/10/2011    Hiatus hernia, gastric mucosal abnormality characterized by erythema     Allergies   Allergen Reactions   • Penicillins Anaphylaxis   • Sulfa Antibiotics Hives     Social History     Socioeconomic History   • Marital status:      Spouse name: Not on file   • Number of children: Not on file   • Years of education: Not on file   • Highest education level: Not on file   Tobacco Use   • Smoking status: Never Smoker   • Smokeless tobacco: Never Used   Substance and Sexual Activity   • Alcohol use: No     Comment: Daily caffeine use   • Drug use: No   • Sexual activity: Defer     Partners: Male     Birth control/protection: Post-menopausal     Social History     Tobacco Use   Smoking Status Never Smoker   Smokeless Tobacco Never Used       family history includes Atrial fibrillation in her mother; Heart attack in her brother; Heart disease in her brother, brother, father, and maternal grandmother.  Current Outpatient Medications on File Prior to Visit   Medication Sig Dispense Refill   • Unable to find DHEA Test 10 mg-5mg/ml cream  3   • atorvastatin (LIPITOR) 40 MG tablet take 1 po daily  5   • Blood Glucose Monitoring Suppl (ACCU-CHEK MEAGAN PLUS) w/Device kit ACCU-CHEK MEAGAN PLUS w/Device KIT     •  chlorthalidone (HYGROTON) 25 MG tablet take 1 po daily  4   • escitalopram (LEXAPRO) 10 MG tablet TAKE 1 TABLET BY MOUTH EVERY DAY 30 tablet 2   • glucose blood (ACCU-CHEK MEAGAN) test strip ACCU-CHEK MEAGAN IN VITRO STRIP     • losartan (COZAAR) 100 MG tablet take 1 po daily  11   • metFORMIN (GLUCOPHAGE) 1000 MG tablet TAKE 1 TABLET BY MOUTH TWICE A DAY WITH FOOD 180 tablet 2   • metoprolol tartrate (LOPRESSOR) 25 MG tablet TAKE 1 TABLET BY MOUTH TWICE A  tablet 1   • ONGLYZA 5 MG tablet TAKE 1 TABLET BY MOUTH IN THE MORNING 90 tablet 1   • progesterone (PROMETRIUM) 200 MG capsule TAKE ONE (1) CAPSULE BY MOUTH EVERY NIGHT AT BEDTIME AS DIRECTED  3   • Unable to find TAKE ONE (1) CAPSULE BY MOUTH EVERY NIGHT AT BEDTIME AS DIRECTED  4   • Unable to find TAKE ONE (1) CAPSULE BY MOUTH EVERY NIGHT AT BEDTIME AS DIRECTED  4   • Unable to find APPLY ONE-HALF (1/2) MILLILITER (ML) TOPICALLY DAILY  2   • Unable to find TAKE ONE (1) CAPSULE BY MOUTH EVERY NIGHT AT BEDTIME AS DIRECTED  4     No current facility-administered medications on file prior to visit.      Patient Active Problem List   Diagnosis   • Epigastric pain   • Pharyngoesophageal dysphagia   • Polyp of transverse colon   • Hypertension   • Constipation   • Xiphoid pain   • Depression   • Anxiety   • Hyperlipidemia   • Type 2 diabetes mellitus (CMS/HCC)   • Steatosis of liver       The following portions of the patient's history were reviewed and updated as appropriate: allergies, current medications, past family history, past medical history, past social history, past surgical history and problem list.    Review of Systems   Constitutional: Negative for chills, fatigue and fever.   HENT: Negative for sinus pressure and sore throat.    Eyes: Negative for blurred vision.   Respiratory: Negative for cough and shortness of breath.    Cardiovascular: Negative for chest pain and palpitations.   Gastrointestinal: Negative for abdominal pain.   Endocrine:  Negative for polydipsia and polyuria.   Skin: Negative for rash.   Neurological: Negative for dizziness and headache.   Hematological: Negative for adenopathy.   Psychiatric/Behavioral: Negative for depressed mood.       Objective   /58 (BP Location: Left arm, Patient Position: Sitting, Cuff Size: Large Adult)   Pulse (!) 42   Temp 98 °F (36.7 °C) (Oral)   Wt 109 kg (241 lb)   SpO2 95%   BMI 46.29 kg/m²   Physical Exam   Constitutional: She is oriented to person, place, and time. She appears well-developed. No distress.   HENT:   Head: Normocephalic.   Eyes: Conjunctivae and lids are normal.   Neck: Trachea normal. No thyroid mass and no thyromegaly present.   Cardiovascular: Normal rate, regular rhythm and normal heart sounds.   Pulmonary/Chest: Effort normal and breath sounds normal.    Anais had a diabetic foot exam performed today.    Neurological Sensory Findings -  No right ankle/foot altered proprioception and no left ankle/foot altered proprioception  Vascular Status -  Her right foot exhibits normal foot vasculature  and no edema. Her left foot exhibits normal foot vasculature  and no edema.  Skin Integrity  -  Her right foot skin is intact.Her left foot skin is intact..  Lymphadenopathy:     She has no cervical adenopathy.   Neurological: She is alert and oriented to person, place, and time.   Skin: Skin is warm and dry.   Psychiatric: She has a normal mood and affect. Her speech is normal and behavior is normal. She is attentive.       Orders Only on 11/20/2019   Component Date Value Ref Range Status   • Hemoglobin A1C 11/20/2019 6.6* 3.5 - 5.6 % Final   • Glucose 11/20/2019 148* 65 - 99 mg/dL Final   • BUN 11/20/2019 16  8 - 23 mg/dL Final   • Creatinine 11/20/2019 0.67  0.57 - 1.00 mg/dL Final   • Sodium 11/20/2019 141  136 - 145 mmol/L Final   • Potassium 11/20/2019 3.9  3.5 - 5.2 mmol/L Final   • Chloride 11/20/2019 98  98 - 107 mmol/L Final   • CO2 11/20/2019 27.9  22.0 - 29.0 mmol/L  Final   • Calcium 11/20/2019 9.7  8.6 - 10.5 mg/dL Final   • Total Protein 11/20/2019 7.6  6.0 - 8.5 g/dL Final   • Albumin 11/20/2019 4.30  3.50 - 5.20 g/dL Final   • ALT (SGPT) 11/20/2019 57* 1 - 33 U/L Final   • AST (SGOT) 11/20/2019 42* 1 - 32 U/L Final   • Alkaline Phosphatase 11/20/2019 69  39 - 117 U/L Final   • Total Bilirubin 11/20/2019 0.6  0.2 - 1.2 mg/dL Final   • eGFR Non  Amer 11/20/2019 88  >60 mL/min/1.73 Final   • Globulin 11/20/2019 3.3  gm/dL Final   • A/G Ratio 11/20/2019 1.3  g/dL Final   • BUN/Creatinine Ratio 11/20/2019 23.9  7.0 - 25.0 Final   • Anion Gap 11/20/2019 15.1* 5.0 - 15.0 mmol/L Final   • Total Cholesterol 11/20/2019 237* 0 - 200 mg/dL Final   • Triglycerides 11/20/2019 179* 0 - 150 mg/dL Final   • HDL Cholesterol 11/20/2019 49  40 - 60 mg/dL Final   • LDL Cholesterol  11/20/2019 152* 0 - 100 mg/dL Final   • VLDL Cholesterol 11/20/2019 35.8  5 - 40 mg/dL Final   • LDL/HDL Ratio 11/20/2019 3.11   Final           Assessment/Plan   Problems Addressed this Visit        Cardiovascular and Mediastinum    Hypertension     Hypertension is improving with treatment.  Continue current treatment regimen.  Blood pressure will be reassessed at the next regular appointment.         Hyperlipidemia     Lipid abnormalities are worsening.  Pharmacotherapy as ordered.  Lipids will be reassessed in 6 months.            Endocrine    Type 2 diabetes mellitus (CMS/HCC) - Primary     Diabetes is improving with treatment.   Continue current treatment regimen.  Diabetes will be reassessed in 6 months.               Anais was seen today for diabetes.    Diagnoses and all orders for this visit:    Type 2 diabetes mellitus with hyperglycemia, without long-term current use of insulin (CMS/HCC)    Essential hypertension    Hyperlipidemia, unspecified hyperlipidemia type

## 2019-11-26 NOTE — PATIENT INSTRUCTIONS

## 2019-12-09 RX ORDER — ESCITALOPRAM OXALATE 10 MG/1
TABLET ORAL
Qty: 90 TABLET | Refills: 3 | Status: SHIPPED | OUTPATIENT
Start: 2019-12-09 | End: 2020-05-26

## 2020-01-09 RX ORDER — SAXAGLIPTIN 5 MG/1
TABLET, FILM COATED ORAL
Qty: 90 TABLET | Refills: 3 | Status: SHIPPED | OUTPATIENT
Start: 2020-01-09 | End: 2020-05-01

## 2020-01-15 RX ORDER — CHLORTHALIDONE 25 MG/1
25 TABLET ORAL DAILY
Qty: 90 TABLET | Refills: 3 | Status: SHIPPED | OUTPATIENT
Start: 2020-01-15 | End: 2020-01-22 | Stop reason: SDUPTHER

## 2020-01-22 ENCOUNTER — PATIENT MESSAGE (OUTPATIENT)
Dept: FAMILY MEDICINE CLINIC | Facility: CLINIC | Age: 70
End: 2020-01-22

## 2020-01-22 RX ORDER — CHLORTHALIDONE 25 MG/1
25 TABLET ORAL DAILY
Qty: 90 TABLET | Refills: 3 | Status: SHIPPED | OUTPATIENT
Start: 2020-01-22 | End: 2021-01-25 | Stop reason: HOSPADM

## 2020-02-24 RX ORDER — LOSARTAN POTASSIUM 100 MG/1
TABLET ORAL
Qty: 30 TABLET | Refills: 5 | Status: SHIPPED | OUTPATIENT
Start: 2020-02-24 | End: 2020-08-21

## 2020-05-12 ENCOUNTER — TELEPHONE (OUTPATIENT)
Dept: FAMILY MEDICINE CLINIC | Facility: CLINIC | Age: 70
End: 2020-05-12

## 2020-05-19 DIAGNOSIS — E11.65 TYPE 2 DIABETES MELLITUS WITH HYPERGLYCEMIA, WITHOUT LONG-TERM CURRENT USE OF INSULIN (HCC): ICD-10-CM

## 2020-05-19 DIAGNOSIS — I10 ESSENTIAL HYPERTENSION: Primary | ICD-10-CM

## 2020-05-19 DIAGNOSIS — E78.5 HYPERLIPIDEMIA, UNSPECIFIED HYPERLIPIDEMIA TYPE: ICD-10-CM

## 2020-05-22 ENCOUNTER — APPOINTMENT (OUTPATIENT)
Dept: LAB | Facility: HOSPITAL | Age: 70
End: 2020-05-22

## 2020-05-22 LAB
ALBUMIN SERPL-MCNC: 4.2 G/DL (ref 3.5–5.2)
ALBUMIN/GLOB SERPL: 1.4 G/DL
ALP SERPL-CCNC: 62 U/L (ref 39–117)
ALT SERPL W P-5'-P-CCNC: 72 U/L (ref 1–33)
ANION GAP SERPL CALCULATED.3IONS-SCNC: 11.8 MMOL/L (ref 5–15)
AST SERPL-CCNC: 48 U/L (ref 1–32)
BILIRUB SERPL-MCNC: 0.5 MG/DL (ref 0.2–1.2)
BUN BLD-MCNC: 18 MG/DL (ref 8–23)
BUN/CREAT SERPL: 22 (ref 7–25)
CALCIUM SPEC-SCNC: 9.7 MG/DL (ref 8.6–10.5)
CHLORIDE SERPL-SCNC: 98 MMOL/L (ref 98–107)
CHOLEST SERPL-MCNC: 186 MG/DL (ref 0–200)
CO2 SERPL-SCNC: 29.2 MMOL/L (ref 22–29)
CREAT BLD-MCNC: 0.82 MG/DL (ref 0.57–1)
GFR SERPL CREATININE-BSD FRML MDRD: 70 ML/MIN/1.73
GLOBULIN UR ELPH-MCNC: 2.9 GM/DL
GLUCOSE BLD-MCNC: 153 MG/DL (ref 65–99)
HBA1C MFR BLD: 6.6 % (ref 3.5–5.6)
HDLC SERPL-MCNC: 51 MG/DL (ref 40–60)
LDLC SERPL CALC-MCNC: 107 MG/DL (ref 0–100)
LDLC/HDLC SERPL: 2.09 {RATIO}
POTASSIUM BLD-SCNC: 3.9 MMOL/L (ref 3.5–5.2)
PROT SERPL-MCNC: 7.1 G/DL (ref 6–8.5)
SODIUM BLD-SCNC: 139 MMOL/L (ref 136–145)
TRIGL SERPL-MCNC: 141 MG/DL (ref 0–150)
VLDLC SERPL-MCNC: 28.2 MG/DL (ref 5–40)

## 2020-05-22 PROCEDURE — 80061 LIPID PANEL: CPT | Performed by: FAMILY MEDICINE

## 2020-05-22 PROCEDURE — 83036 HEMOGLOBIN GLYCOSYLATED A1C: CPT | Performed by: FAMILY MEDICINE

## 2020-05-22 PROCEDURE — 36415 COLL VENOUS BLD VENIPUNCTURE: CPT | Performed by: FAMILY MEDICINE

## 2020-05-22 PROCEDURE — 80053 COMPREHEN METABOLIC PANEL: CPT | Performed by: FAMILY MEDICINE

## 2020-05-26 ENCOUNTER — OFFICE VISIT (OUTPATIENT)
Dept: FAMILY MEDICINE CLINIC | Facility: CLINIC | Age: 70
End: 2020-05-26

## 2020-05-26 VITALS
DIASTOLIC BLOOD PRESSURE: 66 MMHG | HEART RATE: 74 BPM | SYSTOLIC BLOOD PRESSURE: 115 MMHG | WEIGHT: 250 LBS | BODY MASS INDEX: 48.02 KG/M2 | TEMPERATURE: 98.2 F | OXYGEN SATURATION: 96 %

## 2020-05-26 DIAGNOSIS — I10 ESSENTIAL HYPERTENSION: ICD-10-CM

## 2020-05-26 DIAGNOSIS — E11.9 TYPE 2 DIABETES MELLITUS WITHOUT COMPLICATION, WITHOUT LONG-TERM CURRENT USE OF INSULIN (HCC): Primary | ICD-10-CM

## 2020-05-26 DIAGNOSIS — E78.5 HYPERLIPIDEMIA, UNSPECIFIED HYPERLIPIDEMIA TYPE: ICD-10-CM

## 2020-05-26 PROCEDURE — 99214 OFFICE O/P EST MOD 30 MIN: CPT | Performed by: FAMILY MEDICINE

## 2020-05-26 RX ORDER — ESCITALOPRAM OXALATE 5 MG/1
TABLET ORAL
COMMUNITY
Start: 2020-05-03 | End: 2021-01-23

## 2020-05-26 RX ORDER — LAMOTRIGINE 100 MG/1
TABLET ORAL
COMMUNITY
Start: 2020-05-25 | End: 2020-12-07 | Stop reason: ALTCHOICE

## 2020-05-26 RX ORDER — BUPROPION HYDROCHLORIDE 75 MG/1
75 TABLET ORAL EVERY MORNING
COMMUNITY
Start: 2020-05-25

## 2020-05-26 NOTE — PROGRESS NOTES
Subjective   Chief Complaint   Patient presents with   • Diabetes Mellitus     6 mos f/u     Anais West is a 69 y.o. female.     Diabetes   She presents for her follow-up diabetic visit. She has type 2 diabetes mellitus. Her disease course has been stable. There are no hypoglycemic associated symptoms. Pertinent negatives for hypoglycemia include no dizziness. There are no diabetic associated symptoms. Pertinent negatives for diabetes include no blurred vision, no chest pain and no polyuria. There are no hypoglycemic complications. Symptoms are stable. There are no diabetic complications. Risk factors for coronary artery disease include diabetes mellitus, hypertension and post-menopausal. Current diabetic treatment includes diet and oral agent (monotherapy). She is compliant with treatment all of the time. Her weight is increasing steadily. She is following a diabetic diet. She participates in exercise intermittently. There is no change in her home blood glucose trend. An ACE inhibitor/angiotensin II receptor blocker is being taken.   Hypertension   This is a chronic problem. The current episode started more than 1 year ago. The problem has been gradually improving since onset. The problem is controlled. Pertinent negatives include no anxiety, blurred vision, chest pain, orthopnea, palpitations, peripheral edema or shortness of breath. There are no associated agents to hypertension. Current antihypertension treatment includes beta blockers, angiotensin blockers and diuretics. The current treatment provides significant improvement. There are no compliance problems.  There is no history of angina. There is no history of chronic renal disease.   Hyperlipidemia   This is a chronic problem. The current episode started more than 1 year ago. The problem is controlled. Recent lipid tests were reviewed and are normal. She has no history of chronic renal disease. There are no known factors aggravating her hyperlipidemia.  Pertinent negatives include no chest pain or shortness of breath. Current antihyperlipidemic treatment includes statins. The current treatment provides significant improvement of lipids.      Past Medical History:   Diagnosis Date   • Abdominal pain    • Anxiety    • Cholelithiasis 1976    surgery   • Depression    • Diabetes mellitus (CMS/HCC)    • QUIÑONES (dyspnea on exertion)    • Excessive daytime sleepiness    • Heat intolerance    • Hiatal hernia    • History of transfusion    • Hyperlipidemia    • Hypertension    • Joint pain    • Malaise and fatigue    • Myalgia    • Nervously anxious    • Sleep apnea     CPAP   • SOB (shortness of breath)    • Ventricular bigeminy 11/2017     Past Surgical History:   Procedure Laterality Date   • CHOLECYSTECTOMY     • COLONOSCOPY  06/10/2011    One 7 mm polyp in the transverse colon, non-bleeding internal hemorrhoids   • COLONOSCOPY N/A 5/1/2018    IH, polyps (TAs)   • ENDOSCOPY N/A 5/1/2018    Gastritis   • HYSTERECTOMY     • UPPER GASTROINTESTINAL ENDOSCOPY  06/10/2011    Hiatus hernia, gastric mucosal abnormality characterized by erythema     Allergies   Allergen Reactions   • Penicillins Anaphylaxis   • Sulfa Antibiotics Hives     Social History     Socioeconomic History   • Marital status:      Spouse name: Not on file   • Number of children: Not on file   • Years of education: Not on file   • Highest education level: Not on file   Tobacco Use   • Smoking status: Never Smoker   • Smokeless tobacco: Never Used   Substance and Sexual Activity   • Alcohol use: No     Comment: Daily caffeine use   • Drug use: No   • Sexual activity: Defer     Partners: Male     Birth control/protection: Post-menopausal     Social History     Tobacco Use   Smoking Status Never Smoker   Smokeless Tobacco Never Used       family history includes Atrial fibrillation in her mother; Heart attack in her brother; Heart disease in her brother, brother, father, and maternal grandmother.  Current  Outpatient Medications on File Prior to Visit   Medication Sig Dispense Refill   • buPROPion (WELLBUTRIN) 75 MG tablet      • escitalopram (LEXAPRO) 5 MG tablet      • lamoTRIgine (LaMICtal) 100 MG tablet      • NON FORMULARY APPLY ONE-HALF (1/2) MILLILITER (ML) TWICE DAILY     • Blood Glucose Monitoring Suppl (ACCU-CHEK MEAGAN PLUS) w/Device kit ACCU-CHEK MEAGAN PLUS w/Device KIT     • chlorthalidone (HYGROTON) 25 MG tablet Take 1 tablet by mouth Daily. 90 tablet 3   • glucose blood (ACCU-CHEK MEAGAN) test strip ACCU-CHEK MEAGAN IN VITRO STRIP     • losartan (COZAAR) 100 MG tablet TAKE 1 TABLET BY MOUTH EVERY DAY 30 tablet 5   • metFORMIN (GLUCOPHAGE) 1000 MG tablet TAKE 1 TABLET BY MOUTH TWICE A DAY WITH FOOD 180 tablet 2   • metoprolol tartrate (LOPRESSOR) 25 MG tablet TAKE 1 TABLET BY MOUTH TWICE A  tablet 1   • SITagliptin (Januvia) 50 MG tablet Take 1 tablet by mouth Daily. 90 tablet 1   • [DISCONTINUED] atorvastatin (LIPITOR) 40 MG tablet take 1 po daily  5   • [DISCONTINUED] escitalopram (LEXAPRO) 10 MG tablet TAKE 1 TABLET BY MOUTH EVERY DAY 90 tablet 3   • [DISCONTINUED] progesterone (PROMETRIUM) 200 MG capsule TAKE ONE (1) CAPSULE BY MOUTH EVERY NIGHT AT BEDTIME AS DIRECTED  3   • [DISCONTINUED] Unable to find TAKE ONE (1) CAPSULE BY MOUTH EVERY NIGHT AT BEDTIME AS DIRECTED  4   • [DISCONTINUED] Unable to find TAKE ONE (1) CAPSULE BY MOUTH EVERY NIGHT AT BEDTIME AS DIRECTED  4   • [DISCONTINUED] Unable to find APPLY ONE-HALF (1/2) MILLILITER (ML) TOPICALLY DAILY  2   • [DISCONTINUED] Unable to find TAKE ONE (1) CAPSULE BY MOUTH EVERY NIGHT AT BEDTIME AS DIRECTED  4   • [DISCONTINUED] Unable to find DHEA Test 10 mg-5mg/ml cream  3     No current facility-administered medications on file prior to visit.      Patient Active Problem List   Diagnosis   • Epigastric pain   • Pharyngoesophageal dysphagia   • Polyp of transverse colon   • Hypertension   • Constipation   • Xiphoid pain   • Depression   •  Anxiety   • Hyperlipidemia   • Type 2 diabetes mellitus (CMS/HCC)   • Steatosis of liver       The following portions of the patient's history were reviewed and updated as appropriate: allergies, current medications, past family history, past medical history, past social history, past surgical history and problem list.    Review of Systems   Constitutional: Negative for chills and fever.   HENT: Negative for sinus pressure and sore throat.    Eyes: Negative for blurred vision.   Respiratory: Negative for cough and shortness of breath.    Cardiovascular: Negative for chest pain, palpitations and orthopnea.   Gastrointestinal: Negative for abdominal pain.   Endocrine: Negative for polyuria.   Skin: Negative for rash.   Neurological: Negative for dizziness and headache.   Hematological: Negative for adenopathy.   Psychiatric/Behavioral: Negative for depressed mood.       Objective   /66 (BP Location: Left arm, Patient Position: Sitting, Cuff Size: Large Adult)   Pulse 74   Temp 98.2 °F (36.8 °C) Comment: contactless  Wt 113 kg (250 lb)   SpO2 96%   BMI 48.02 kg/m²   Physical Exam   Constitutional: She is oriented to person, place, and time. She appears well-developed. No distress.   HENT:   Head: Normocephalic.   Eyes: Conjunctivae and lids are normal.   Neck: Trachea normal. No thyroid mass and no thyromegaly present.   Cardiovascular: Normal rate, regular rhythm and normal heart sounds.   Pulmonary/Chest: Effort normal and breath sounds normal.   Lymphadenopathy:     She has no cervical adenopathy.   Neurological: She is alert and oriented to person, place, and time.   Skin: Skin is warm and dry.   Psychiatric: She has a normal mood and affect. Her speech is normal and behavior is normal. She is attentive.       No visits with results within 1 Week(s) from this visit.   Latest known visit with results is:   Orders Only on 05/19/2020   Component Date Value Ref Range Status   • Glucose 05/22/2020 153* 65 -  99 mg/dL Final   • BUN 05/22/2020 18  8 - 23 mg/dL Final   • Creatinine 05/22/2020 0.82  0.57 - 1.00 mg/dL Final   • Sodium 05/22/2020 139  136 - 145 mmol/L Final   • Potassium 05/22/2020 3.9  3.5 - 5.2 mmol/L Final   • Chloride 05/22/2020 98  98 - 107 mmol/L Final   • CO2 05/22/2020 29.2* 22.0 - 29.0 mmol/L Final   • Calcium 05/22/2020 9.7  8.6 - 10.5 mg/dL Final   • Total Protein 05/22/2020 7.1  6.0 - 8.5 g/dL Final   • Albumin 05/22/2020 4.20  3.50 - 5.20 g/dL Final   • ALT (SGPT) 05/22/2020 72* 1 - 33 U/L Final   • AST (SGOT) 05/22/2020 48* 1 - 32 U/L Final   • Alkaline Phosphatase 05/22/2020 62  39 - 117 U/L Final   • Total Bilirubin 05/22/2020 0.5  0.2 - 1.2 mg/dL Final   • eGFR Non African Amer 05/22/2020 70  >60 mL/min/1.73 Final   • Globulin 05/22/2020 2.9  gm/dL Final   • A/G Ratio 05/22/2020 1.4  g/dL Final   • BUN/Creatinine Ratio 05/22/2020 22.0  7.0 - 25.0 Final   • Anion Gap 05/22/2020 11.8  5.0 - 15.0 mmol/L Final   • Hemoglobin A1C 05/22/2020 6.6* 3.5 - 5.6 % Final   • Total Cholesterol 05/22/2020 186  0 - 200 mg/dL Final   • Triglycerides 05/22/2020 141  0 - 150 mg/dL Final   • HDL Cholesterol 05/22/2020 51  40 - 60 mg/dL Final   • LDL Cholesterol  05/22/2020 107* 0 - 100 mg/dL Final   • VLDL Cholesterol 05/22/2020 28.2  5 - 40 mg/dL Final   • LDL/HDL Ratio 05/22/2020 2.09   Final           Assessment/Plan   Problems Addressed this Visit        Cardiovascular and Mediastinum    Hypertension     Hypertension is improving with treatment.  Continue current treatment regimen.  Blood pressure will be reassessed at the next regular appointment.         Hyperlipidemia     Lipid abnormalities are improving with treatment.  Pharmacotherapy as ordered.  Lipids will be reassessed in 6 months.            Endocrine    Type 2 diabetes mellitus (CMS/HCC) - Primary     Diabetes is improving with treatment.   Continue current treatment regimen.  Diabetes will be reassessed in 6 months.

## 2020-08-21 RX ORDER — LOSARTAN POTASSIUM 100 MG/1
TABLET ORAL
Qty: 30 TABLET | Refills: 5 | Status: SHIPPED | OUTPATIENT
Start: 2020-08-21 | End: 2021-02-05

## 2020-10-16 RX ORDER — SITAGLIPTIN 50 MG/1
TABLET, FILM COATED ORAL
Qty: 30 TABLET | Refills: 4 | Status: SHIPPED | OUTPATIENT
Start: 2020-10-16 | End: 2021-03-08

## 2020-12-07 ENCOUNTER — LAB (OUTPATIENT)
Dept: LAB | Facility: HOSPITAL | Age: 70
End: 2020-12-07

## 2020-12-07 ENCOUNTER — OFFICE VISIT (OUTPATIENT)
Dept: FAMILY MEDICINE CLINIC | Facility: CLINIC | Age: 70
End: 2020-12-07

## 2020-12-07 VITALS
DIASTOLIC BLOOD PRESSURE: 83 MMHG | HEART RATE: 83 BPM | TEMPERATURE: 97.5 F | WEIGHT: 250 LBS | OXYGEN SATURATION: 97 % | SYSTOLIC BLOOD PRESSURE: 137 MMHG | BODY MASS INDEX: 48.02 KG/M2

## 2020-12-07 DIAGNOSIS — Z11.59 ENCOUNTER FOR HEPATITIS C SCREENING TEST FOR LOW RISK PATIENT: ICD-10-CM

## 2020-12-07 DIAGNOSIS — Z00.00 WELLNESS EXAMINATION: Primary | ICD-10-CM

## 2020-12-07 DIAGNOSIS — Z23 NEED FOR VACCINATION: ICD-10-CM

## 2020-12-07 DIAGNOSIS — E11.9 TYPE 2 DIABETES MELLITUS WITHOUT COMPLICATION, WITHOUT LONG-TERM CURRENT USE OF INSULIN (HCC): ICD-10-CM

## 2020-12-07 DIAGNOSIS — E78.5 HYPERLIPIDEMIA, UNSPECIFIED HYPERLIPIDEMIA TYPE: ICD-10-CM

## 2020-12-07 DIAGNOSIS — Z00.00 WELLNESS EXAMINATION: ICD-10-CM

## 2020-12-07 LAB
ALBUMIN SERPL-MCNC: 4.6 G/DL (ref 3.5–5.2)
ALBUMIN UR-MCNC: <1.2 MG/DL
ALBUMIN/GLOB SERPL: 1.8 G/DL
ALP SERPL-CCNC: 75 U/L (ref 39–117)
ALT SERPL W P-5'-P-CCNC: 95 U/L (ref 1–33)
ANION GAP SERPL CALCULATED.3IONS-SCNC: 10.3 MMOL/L (ref 5–15)
AST SERPL-CCNC: 59 U/L (ref 1–32)
BILIRUB SERPL-MCNC: 0.4 MG/DL (ref 0–1.2)
BUN SERPL-MCNC: 14 MG/DL (ref 8–23)
BUN/CREAT SERPL: 17.1 (ref 7–25)
CALCIUM SPEC-SCNC: 10.1 MG/DL (ref 8.6–10.5)
CHLORIDE SERPL-SCNC: 95 MMOL/L (ref 98–107)
CHOLEST SERPL-MCNC: 213 MG/DL (ref 0–200)
CO2 SERPL-SCNC: 29.7 MMOL/L (ref 22–29)
CREAT SERPL-MCNC: 0.82 MG/DL (ref 0.57–1)
GFR SERPL CREATININE-BSD FRML MDRD: 69 ML/MIN/1.73
GLOBULIN UR ELPH-MCNC: 2.5 GM/DL
GLUCOSE SERPL-MCNC: 153 MG/DL (ref 65–99)
HBA1C MFR BLD: 6.9 % (ref 3.5–5.6)
HCV AB SER DONR QL: NORMAL
HDLC SERPL-MCNC: 56 MG/DL (ref 40–60)
LDLC SERPL CALC-MCNC: 125 MG/DL (ref 0–100)
LDLC/HDLC SERPL: 2.15 {RATIO}
POTASSIUM SERPL-SCNC: 4.6 MMOL/L (ref 3.5–5.2)
PROT SERPL-MCNC: 7.1 G/DL (ref 6–8.5)
SODIUM SERPL-SCNC: 135 MMOL/L (ref 136–145)
TRIGL SERPL-MCNC: 182 MG/DL (ref 0–150)
VLDLC SERPL-MCNC: 32 MG/DL (ref 5–40)

## 2020-12-07 PROCEDURE — 82043 UR ALBUMIN QUANTITATIVE: CPT | Performed by: FAMILY MEDICINE

## 2020-12-07 PROCEDURE — 90694 VACC AIIV4 NO PRSRV 0.5ML IM: CPT | Performed by: FAMILY MEDICINE

## 2020-12-07 PROCEDURE — 83036 HEMOGLOBIN GLYCOSYLATED A1C: CPT | Performed by: FAMILY MEDICINE

## 2020-12-07 PROCEDURE — 99397 PER PM REEVAL EST PAT 65+ YR: CPT | Performed by: FAMILY MEDICINE

## 2020-12-07 PROCEDURE — 80053 COMPREHEN METABOLIC PANEL: CPT | Performed by: FAMILY MEDICINE

## 2020-12-07 PROCEDURE — G0008 ADMIN INFLUENZA VIRUS VAC: HCPCS | Performed by: FAMILY MEDICINE

## 2020-12-07 PROCEDURE — 80061 LIPID PANEL: CPT | Performed by: FAMILY MEDICINE

## 2020-12-07 PROCEDURE — 36415 COLL VENOUS BLD VENIPUNCTURE: CPT | Performed by: FAMILY MEDICINE

## 2020-12-07 PROCEDURE — 86803 HEPATITIS C AB TEST: CPT

## 2020-12-07 RX ORDER — LAMOTRIGINE 150 MG/1
TABLET ORAL
COMMUNITY
Start: 2020-11-16 | End: 2021-01-23

## 2020-12-07 RX ORDER — QUETIAPINE FUMARATE 25 MG/1
TABLET, FILM COATED ORAL
COMMUNITY
Start: 2020-11-16

## 2020-12-07 NOTE — PROGRESS NOTES
Wellness Visit   The ABC's of the Annual Wellness Visit    Chief Complaint   Patient presents with   • Diabetes     6 mos f/u       HPI:  Anais West, -1950, is a 70 y.o. female who presents for a Wellness Visit.    Recent Hospitalizations:  No hospitalization(s) within the last year..    Current Medical Providers:  Patient Care Team:  Angelia Springer MD as PCP - General (Family Medicine)  Lamont Brar MD as Consulting Physician (Orthopedic Surgery)    Health Habits and Functional and Cognitive Screening and Depression Screening:  Functional & Cognitive Status 2020   Do you have difficulty preparing food and eating? No   Do you have difficulty bathing yourself, getting dressed or grooming yourself? No   Do you have difficulty using the toilet? No   Do you have difficulty moving around from place to place? No   Do you have trouble with steps or getting out of a bed or a chair? No   Current Diet Well Balanced Diet   Dental Exam Up to date   Eye Exam Up to date   Exercise (times per week) 5 times per week   Current Exercises Include Aerobics   Do you need help using the phone?  No   Are you deaf or do you have serious difficulty hearing?  No   Do you need help with transportation? No   Do you need help shopping? No   Do you need help preparing meals?  No   Do you need help with housework?  No   Do you need help with laundry? No   Do you need help taking your medications? No   Do you ever drive or ride in a car without wearing a seat belt? No   Have you felt unusual stress, anger or loneliness in the last month? No   Who do you live with? Spouse   If you need help, do you have trouble finding someone available to you? No   Do you have difficulty concentrating, remembering or making decisions? No       Compared to one year ago, the patient feels her physical health is the same and her mental health is the same.    Depression Screen:  PHQ-2/PHQ-9 Depression Screening 2020   Little interest or  pleasure in doing things 0   Feeling down, depressed, or hopeless 0   Total Score 0         Past Medical/Family/Social History:  The following portions of the patient's history were reviewed and updated as appropriate: allergies, current medications, past family history, past medical history, past social history, past surgical history and problem list.    Allergies   Allergen Reactions   • Penicillins Anaphylaxis   • Sulfa Antibiotics Hives         Current Outpatient Medications:   •  lamoTRIgine (LaMICtal) 150 MG tablet, TAKE ONE (1) TABLET BY MOUTH EVERY MORNING, Disp: , Rfl:   •  NON FORMULARY, APPLY ONE (1) MILLILITER (ML) TO inner thigh AND RUB IN WELL IN THE MORNING, Disp: , Rfl:   •  QUEtiapine (SEROquel) 25 MG tablet, TABLET ONE-HALF (1/2) TO ONE (1) TABLET BY MOUTH EVERY NIGHT AT BEDTIME, Disp: , Rfl:   •  Blood Glucose Monitoring Suppl (ACCU-CHEK MEAGAN PLUS) w/Device kit, ACCU-CHEK MEAGAN PLUS w/Device KIT, Disp: , Rfl:   •  buPROPion (WELLBUTRIN) 75 MG tablet, , Disp: , Rfl:   •  chlorthalidone (HYGROTON) 25 MG tablet, Take 1 tablet by mouth Daily., Disp: 90 tablet, Rfl: 3  •  escitalopram (LEXAPRO) 5 MG tablet, , Disp: , Rfl:   •  glucose blood (ACCU-CHEK MEAGAN) test strip, ACCU-CHEK MEAGAN IN VITRO STRIP, Disp: , Rfl:   •  Januvia 50 MG tablet, TAKE ONE (1) TABLET BY MOUTH DAILY, Disp: 30 tablet, Rfl: 4  •  losartan (COZAAR) 100 MG tablet, TAKE ONE (1) TABLET BY MOUTH EVERY DAY, Disp: 30 tablet, Rfl: 5  •  metFORMIN (GLUCOPHAGE) 1000 MG tablet, Take 1 tablet by mouth 2 (Two) Times a Day With Meals., Disp: 180 tablet, Rfl: 2  •  metoprolol tartrate (LOPRESSOR) 25 MG tablet, TAKE 1 TABLET BY MOUTH TWICE A DAY, Disp: 180 tablet, Rfl: 1  •  NON FORMULARY, APPLY ONE-HALF (1/2) MILLILITER (ML) TWICE DAILY, Disp: , Rfl:     Aspirin use counseling: Does not need ASA (and currently is not on it)    Current medication list contains no high risk medications.  No harmful drug interactions have been identified.      Family History   Problem Relation Age of Onset   • Atrial fibrillation Mother    • Heart disease Father    • Heart attack Brother    • Heart disease Brother    • Heart disease Maternal Grandmother    • Heart disease Brother    • Malig Hyperthermia Neg Hx        Social History     Tobacco Use   • Smoking status: Never Smoker   • Smokeless tobacco: Never Used   Substance Use Topics   • Alcohol use: No     Comment: Daily caffeine use       Past Surgical History:   Procedure Laterality Date   • CHOLECYSTECTOMY     • COLONOSCOPY  06/10/2011    One 7 mm polyp in the transverse colon, non-bleeding internal hemorrhoids   • COLONOSCOPY N/A 5/1/2018    IH, polyps (TAs)   • ENDOSCOPY N/A 5/1/2018    Gastritis   • HYSTERECTOMY     • UPPER GASTROINTESTINAL ENDOSCOPY  06/10/2011    Hiatus hernia, gastric mucosal abnormality characterized by erythema       Patient Active Problem List   Diagnosis   • Epigastric pain   • Pharyngoesophageal dysphagia   • Polyp of transverse colon   • Hypertension   • Constipation   • Xiphoid pain   • Depression   • Anxiety   • Hyperlipidemia   • Type 2 diabetes mellitus (CMS/HCC)   • Steatosis of liver   • Wellness examination   • Encounter for hepatitis C screening test for low risk patient       Review of Systems   Constitutional: Negative for chills and fever.   HENT: Negative for sinus pressure, sore throat and swollen glands.    Eyes: Negative for blurred vision.   Respiratory: Negative for cough, shortness of breath and wheezing.    Cardiovascular: Negative for chest pain and palpitations.   Gastrointestinal: Negative for abdominal pain.   Endocrine: Negative for polyuria.   Genitourinary: Negative for difficulty urinating.   Skin: Negative for rash.   Neurological: Negative for dizziness, seizures and headache.   Hematological: Negative for adenopathy.   Psychiatric/Behavioral: Negative for depressed mood.       Objective     Vitals:    12/07/20 0757   BP: 137/83   BP Location: Left arm    Patient Position: Sitting   Cuff Size: Adult   Pulse: 83   Temp: 97.5 °F (36.4 °C)   SpO2: 97%   Weight: 113 kg (250 lb)       Patient's Body mass index is 48.02 kg/m². BMI is above normal parameters. Recommendations include: exercise counseling.      No exam data present    The patient has no evidence of cognitve impairment.     Physical Exam  Constitutional:       General: She is not in acute distress.     Appearance: She is well-developed.   HENT:      Head: Normocephalic.   Eyes:      General: Lids are normal.      Conjunctiva/sclera: Conjunctivae normal.   Neck:      Musculoskeletal: Normal range of motion.      Thyroid: No thyroid mass or thyromegaly.      Trachea: Trachea normal.   Cardiovascular:      Rate and Rhythm: Normal rate and regular rhythm.      Heart sounds: Normal heart sounds.   Pulmonary:      Effort: Pulmonary effort is normal.      Breath sounds: Normal breath sounds.   Abdominal:      Palpations: Abdomen is soft.   Lymphadenopathy:      Cervical: No cervical adenopathy.   Skin:     General: Skin is warm and dry.   Neurological:      Mental Status: She is alert and oriented to person, place, and time.   Psychiatric:         Attention and Perception: She is attentive.         Mood and Affect: Mood normal.         Speech: Speech normal.         Behavior: Behavior normal.         Recent Lab Results:     Lab Results   Component Value Date    CHOL 186 05/22/2020    TRIG 141 05/22/2020    HDL 51 05/22/2020    VLDL 28.2 05/22/2020    LDLHDL 2.09 05/22/2020     No visits with results within 1 Week(s) from this visit.   Latest known visit with results is:   Orders Only on 05/19/2020   Component Date Value Ref Range Status   • Glucose 05/22/2020 153* 65 - 99 mg/dL Final   • BUN 05/22/2020 18  8 - 23 mg/dL Final   • Creatinine 05/22/2020 0.82  0.57 - 1.00 mg/dL Final   • Sodium 05/22/2020 139  136 - 145 mmol/L Final   • Potassium 05/22/2020 3.9  3.5 - 5.2 mmol/L Final   • Chloride 05/22/2020 98  98 -  107 mmol/L Final   • CO2 05/22/2020 29.2* 22.0 - 29.0 mmol/L Final   • Calcium 05/22/2020 9.7  8.6 - 10.5 mg/dL Final   • Total Protein 05/22/2020 7.1  6.0 - 8.5 g/dL Final   • Albumin 05/22/2020 4.20  3.50 - 5.20 g/dL Final   • ALT (SGPT) 05/22/2020 72* 1 - 33 U/L Final   • AST (SGOT) 05/22/2020 48* 1 - 32 U/L Final   • Alkaline Phosphatase 05/22/2020 62  39 - 117 U/L Final   • Total Bilirubin 05/22/2020 0.5  0.2 - 1.2 mg/dL Final   • eGFR Non African Amer 05/22/2020 70  >60 mL/min/1.73 Final   • Globulin 05/22/2020 2.9  gm/dL Final   • A/G Ratio 05/22/2020 1.4  g/dL Final   • BUN/Creatinine Ratio 05/22/2020 22.0  7.0 - 25.0 Final   • Anion Gap 05/22/2020 11.8  5.0 - 15.0 mmol/L Final   • Hemoglobin A1C 05/22/2020 6.6* 3.5 - 5.6 % Final   • Total Cholesterol 05/22/2020 186  0 - 200 mg/dL Final   • Triglycerides 05/22/2020 141  0 - 150 mg/dL Final   • HDL Cholesterol 05/22/2020 51  40 - 60 mg/dL Final   • LDL Cholesterol  05/22/2020 107* 0 - 100 mg/dL Final   • VLDL Cholesterol 05/22/2020 28.2  5 - 40 mg/dL Final   • LDL/HDL Ratio 05/22/2020 2.09   Final   Answers for HPI/ROS submitted by the patient on 12/1/2020   What is the primary reason for your visit?: Other  Please describe your symptoms.: routine follow up for diabetes, high blood pressure, high cholesterol  Have you had these symptoms before?: Yes  How long have you been having these symptoms?: Greater than 2 weeks  Please list any medications you are currently taking for this condition.: Metformin                                 prescribed by psychiatrist for anxiety & depression:  Lamotrigine, Bupropion, Quetiapine, , Januvia                                                      Bupropion HCL                                                               for hormone imbalance, fatigue, Vitamin D ,                                                                                                                                                                       defiency, menopause:  hormone cream,  ,  Metoprolol Tartrate                                                                                                                             Vitamin D + Vitamin K1 + K2, B-Complex + 10 ,                                                                                                                                                                       mg L-Methylfolate, Chlorthalidone, Losartan Potassium  Please describe any probable cause for these symptoms. : fatigue, insomnia, depression and anxiety        Assessment/Plan   Age-appropriate Screening Schedule:  Refer to the list below for future screening recommendations based on patient's age, sex and/or medical conditions.      Health Maintenance   Topic Date Due   • TDAP/TD VACCINES (1 - Tdap) 08/07/1971   • ZOSTER VACCINE (1 of 2) 08/07/2002   • URINE MICROALBUMIN  05/25/2019   • INFLUENZA VACCINE  08/01/2020   • HEMOGLOBIN A1C  11/22/2020   • DIABETIC FOOT EXAM  11/26/2020   • COLONOSCOPY  05/01/2021   • LIPID PANEL  05/22/2021   • DIABETIC EYE EXAM  10/21/2021   • MAMMOGRAM  11/18/2021       Medicare Risks and Personalized Health Plan:  Advance Directive Discussion  Breast Cancer/Mammogram Screening  Diabetic Lab Screening   Immunizations Discussed/Encouraged (specific immunizations; Influenza )      CMS-Preventive Services Quick Reference  Medicare Preventive Services Addressed:  Annual Wellness Visit (AWV)  Diabetes Screening-Lab Order for either glucose quantitative blood (except reagent strip), glucose;post glucose dose(includes glucose), or glucose tolerance test-3 specimens(includes glucose)  Hepatitis C Virus Screening (beneficiaries must fall into one of the following categories to be eligible- high risk for HCV infection, born between 7197-[birth year]156, or history of blood transfusion before 1992)  Screening Mammography     Advance Care Planning:  ACP discussion was held with the patient during this visit.  Patient has an advance directive in EMR which is still valid.     Diagnoses and all orders for this visit:    1. Wellness examination (Primary)  -     Hemoglobin A1c  -     Comprehensive Metabolic Panel  -     Lipid Panel  -     Hepatitis C antibody; Future    2. Type 2 diabetes mellitus without complication, without long-term current use of insulin (CMS/HCC)  -     Hemoglobin A1c  -     Comprehensive Metabolic Panel  -     MicroAlbumin, Urine, Random - Urine, Clean Catch    3. Hyperlipidemia, unspecified hyperlipidemia type  -     Lipid Panel    4. Encounter for hepatitis C screening test for low risk patient  -     Hepatitis C antibody; Future    5. Need for vaccination  -     Fluad Quad 65+ yrs (5059-4473)    Other orders  -     Cancel: FluLaval Quad >6 Months ([id number])        An After Visit Summary and PPPS with all of these plans were given to the patient.      Follow Up:  Return in about 6 months (around 6/7/2021) for Recheck.

## 2020-12-07 NOTE — PATIENT INSTRUCTIONS
Medicare Wellness  Personal Prevention Plan of Service     Date of Office Visit:  2020  Encounter Provider:  Angelia Springer MD  Place of Service:  [facility]  Patient Name: Anais West  :  1950    As part of the Medicare Wellness portion of your visit today, we are providing you with this personalized preventive plan of services (PPPS). This plan is based upon recommendations of the [place] Preventive Services Task Force (USPSTF) and the Advisory Committee on Immunization Practices (ACIP).    This lists the preventive care services that should be considered, and provides dates of when you are due. Items listed as completed are up-to-date and do not require any further intervention.    Health Maintenance   Topic Date Due   • TDAP/TD VACCINES (1 - Tdap) 1971   • ZOSTER VACCINE (1 of 2) 2002   • Pneumococcal Vaccine 65+ (1 of 1 - PPSV23) 2017   • HEPATITIS C SCREENING  2017   • URINE MICROALBUMIN  2019   • INFLUENZA VACCINE  2020   • HEMOGLOBIN A1C  2020   • DIABETIC FOOT EXAM  2020   • COLONOSCOPY  2021   • LIPID PANEL  2021   • DIABETIC EYE EXAM  10/21/2021   • MAMMOGRAM  2021   • ANNUAL PHYSICAL  2021       Orders Placed This Encounter   Procedures   • Hemoglobin A1c   • Comprehensive Metabolic Panel   • Lipid Panel   • MicroAlbumin, Urine, Random - Urine, Clean Catch   • Hepatitis C antibody     Standing Status:   Future     Standing Expiration Date:   2021       Return in about 6 months (around 2021) for Recheck.

## 2021-01-11 ENCOUNTER — TELEMEDICINE (OUTPATIENT)
Dept: FAMILY MEDICINE CLINIC | Facility: CLINIC | Age: 71
End: 2021-01-11

## 2021-01-11 DIAGNOSIS — J40 BRONCHITIS: Primary | ICD-10-CM

## 2021-01-11 PROCEDURE — 99213 OFFICE O/P EST LOW 20 MIN: CPT | Performed by: FAMILY MEDICINE

## 2021-01-11 RX ORDER — GUAIFENESIN AND CODEINE PHOSPHATE 100; 10 MG/5ML; MG/5ML
5 SOLUTION ORAL 3 TIMES DAILY PRN
Qty: 120 ML | Refills: 0 | Status: SHIPPED | OUTPATIENT
Start: 2021-01-11 | End: 2021-01-23

## 2021-01-11 RX ORDER — METHYLPREDNISOLONE 4 MG/1
TABLET ORAL
Qty: 1 EACH | Refills: 0 | Status: SHIPPED | OUTPATIENT
Start: 2021-01-11 | End: 2021-01-23

## 2021-01-11 RX ORDER — AZITHROMYCIN 250 MG/1
TABLET, FILM COATED ORAL
Qty: 6 TABLET | Refills: 0 | Status: SHIPPED | OUTPATIENT
Start: 2021-01-11 | End: 2021-01-23

## 2021-01-11 NOTE — ASSESSMENT & PLAN NOTE
Rx's as ordered.  If no better in 1-2 days or if new symptoms develop may need to consider testing for COVID19

## 2021-01-11 NOTE — PROGRESS NOTES
Chief Complaint  Bronchitis  You have chosen to receive care through a telehealth visit.  Do you consent to use a video/audio connection for your medical care today? Yes    Subjective          Anais West presents to [facility] for   Bronchitis  This is a new problem. The current episode started yesterday. The problem occurs constantly. The problem has been unchanged. Associated symptoms include coughing. Pertinent negatives include no chest pain, chills, fever, myalgias, nausea, swollen glands or vomiting. Nothing aggravates the symptoms. She has tried nothing for the symptoms.       Objective   Vital Signs:   There were no vitals taken for this visit.    Physical Exam  Constitutional:       Appearance: Normal appearance.   Pulmonary:      Effort: Pulmonary effort is normal.   Neurological:      General: No focal deficit present.      Mental Status: She is alert.   Psychiatric:         Mood and Affect: Mood normal.        Result Review :   The following data was reviewed by: Angelia Springer MD on 01/11/2021:  CMP    CMP 5/22/20 12/7/20   BUN 18 14   Creatinine 0.82 0.82   eGFR Non African Am 70 69   Sodium 139 135 (A)   Potassium 3.9 4.6   Chloride 98 95 (A)   Calcium 9.7 10.1   Albumin 4.20 4.60   Total Bilirubin 0.5 0.4   Alkaline Phosphatase 62 75   AST (SGOT) 48 (A) 59 (A)   ALT (SGPT) 72 (A) 95 (A)   (A) Abnormal value                Lipid Panel    Lipid Panel 5/22/20 12/7/20   Triglycerides 141 182 (A)   HDL Cholesterol 51 56   VLDL Cholesterol 28.2 32   LDL Cholesterol  107 (A) 125 (A)   LDL/HDL Ratio 2.09 2.15   (A) Abnormal value                      Assessment and Plan    Problem List Items Addressed This Visit        Pulmonary and Pneumonias    Bronchitis - Primary    Current Assessment & Plan     Rx's as ordered.  If no better in 1-2 days or if new symptoms develop may need to consider testing for COVID19         Relevant Medications    azithromycin (Zithromax  Z-Tono) 250 MG tablet    methylPREDNISolone (MEDROL) 4 MG dose pack    guaiFENesin-codeine (GUAIFENESIN AC) 100-10 MG/5ML liquid          Follow Up   No follow-ups on file.  Patient was given instructions and counseling regarding her condition or for health maintenance advice. Please see specific information pulled into the AVS if appropriate.

## 2021-01-15 ENCOUNTER — TELEPHONE (OUTPATIENT)
Dept: FAMILY MEDICINE CLINIC | Facility: CLINIC | Age: 71
End: 2021-01-15

## 2021-01-15 DIAGNOSIS — Z20.822 CLOSE EXPOSURE TO COVID-19 VIRUS: Primary | ICD-10-CM

## 2021-01-15 NOTE — TELEPHONE ENCOUNTER
Caller: Anais West    Relationship: Self    Best call back number: [phone]    What orders are you requesting (i.e. lab or imaging): COVID-19 TEST    In what timeframe would the patient need to come in: ASAP    Where will you receive your lab/imaging services: [facility]    Additional notes: PATIENT  TESTED POSITIVE AND URGENT CARE TOLD HER TO HAVE AN ORDER SENT OVER TO GET TEST. PLEASE CALL PATIENT TO INFORM WHEN ORDER IS SENT. THANK YOU.

## 2021-01-22 ENCOUNTER — TELEPHONE (OUTPATIENT)
Dept: FAMILY MEDICINE CLINIC | Facility: CLINIC | Age: 71
End: 2021-01-22

## 2021-01-22 NOTE — TELEPHONE ENCOUNTER
Caller: Anais West    Relationship: Self    Best call back number: [phone]    What medication are you requesting:ANTI DIARRHEA    What are your current symptoms: DIARRHEA    How long have you been experiencing symptoms: TUESDAY    If a prescription is needed, what is your preferred pharmacy and phone number: [id number] - [place] [id number]  AT [facility] [address] [facility] [address] [facility] [address]  - [phone] [facility] [phone]      Additional notes:  PT HAS BEEN DIAGNOSED WITH COVID ON 1/15/2021 AND DOES NOT WANT TO COME TO THE OFFICE

## 2021-01-23 ENCOUNTER — HOSPITAL ENCOUNTER (OUTPATIENT)
Facility: HOSPITAL | Age: 71
Setting detail: OBSERVATION
Discharge: HOME | End: 2021-01-25
Attending: HOSPITALIST | Admitting: HOSPITALIST

## 2021-01-23 ENCOUNTER — APPOINTMENT (OUTPATIENT)
Dept: GENERAL RADIOLOGY | Facility: HOSPITAL | Age: 71
End: 2021-01-23

## 2021-01-23 DIAGNOSIS — R09.02 HYPOXIA: ICD-10-CM

## 2021-01-23 DIAGNOSIS — R53.1 GENERALIZED WEAKNESS: ICD-10-CM

## 2021-01-23 DIAGNOSIS — N17.9 ACUTE RENAL FAILURE, UNSPECIFIED ACUTE RENAL FAILURE TYPE (HCC): ICD-10-CM

## 2021-01-23 DIAGNOSIS — U07.1 COVID-19: ICD-10-CM

## 2021-01-23 DIAGNOSIS — R50.81 FEVER IN OTHER DISEASES: Primary | ICD-10-CM

## 2021-01-23 PROBLEM — R50.9 FEVER: Status: ACTIVE | Noted: 2021-01-23

## 2021-01-23 LAB
ABO GROUP BLD: NORMAL
ALBUMIN SERPL-MCNC: 3.7 G/DL (ref 3.5–5.2)
ALBUMIN/GLOB SERPL: 1 G/DL
ALP SERPL-CCNC: 80 U/L (ref 39–117)
ALT SERPL W P-5'-P-CCNC: 89 U/L (ref 1–33)
ANION GAP SERPL CALCULATED.3IONS-SCNC: 16 MMOL/L (ref 5–15)
ARTERIAL PATENCY WRIST A: POSITIVE
AST SERPL-CCNC: 71 U/L (ref 1–32)
ATMOSPHERIC PRESS: ABNORMAL MM[HG]
BACTERIA UR QL AUTO: ABNORMAL /HPF
BASE EXCESS BLDA CALC-SCNC: -1.7 MMOL/L (ref 0–3)
BASOPHILS # BLD AUTO: 0 10*3/MM3 (ref 0–0.2)
BASOPHILS NFR BLD AUTO: 0.4 % (ref 0–1.5)
BDY SITE: ABNORMAL
BILIRUB SERPL-MCNC: 0.5 MG/DL (ref 0–1.2)
BILIRUB UR QL STRIP: NEGATIVE
BLD GP AB SCN SERPL QL: NEGATIVE
BUN SERPL-MCNC: 56 MG/DL (ref 8–23)
BUN/CREAT SERPL: 22 (ref 7–25)
CALCIUM SPEC-SCNC: 10.3 MG/DL (ref 8.6–10.5)
CHLORIDE SERPL-SCNC: 95 MMOL/L (ref 98–107)
CLARITY UR: ABNORMAL
CO2 BLDA-SCNC: 23.3 MMOL/L (ref 22–29)
CO2 SERPL-SCNC: 25 MMOL/L (ref 22–29)
COLOR UR: YELLOW
CREAT SERPL-MCNC: 2.54 MG/DL (ref 0.57–1)
CRP SERPL-MCNC: 8.22 MG/DL (ref 0–0.5)
D DIMER PPP FEU-MCNC: 0.55 MG/L (FEU) (ref 0–0.59)
D-LACTATE SERPL-SCNC: 1.3 MMOL/L (ref 0.5–2)
DEPRECATED RDW RBC AUTO: 41.6 FL (ref 37–54)
EOSINOPHIL # BLD AUTO: 0.1 10*3/MM3 (ref 0–0.4)
EOSINOPHIL NFR BLD AUTO: 0.9 % (ref 0.3–6.2)
ERYTHROCYTE [DISTWIDTH] IN BLOOD BY AUTOMATED COUNT: 13.6 % (ref 12.3–15.4)
FERRITIN SERPL-MCNC: 552.1 NG/ML (ref 13–150)
GFR SERPL CREATININE-BSD FRML MDRD: 19 ML/MIN/1.73
GLOBULIN UR ELPH-MCNC: 3.6 GM/DL
GLUCOSE SERPL-MCNC: 158 MG/DL (ref 65–99)
GLUCOSE UR STRIP-MCNC: NEGATIVE MG/DL
HCO3 BLDA-SCNC: 22.2 MMOL/L (ref 21–28)
HCT VFR BLD AUTO: 39.1 % (ref 34–46.6)
HEMODILUTION: NO
HGB BLD-MCNC: 13.3 G/DL (ref 12–15.9)
HGB UR QL STRIP.AUTO: ABNORMAL
HYALINE CASTS UR QL AUTO: ABNORMAL /LPF
INHALED O2 CONCENTRATION: 21 %
KETONES UR QL STRIP: NEGATIVE
LDH SERPL-CCNC: 299 U/L (ref 135–214)
LEUKOCYTE ESTERASE UR QL STRIP.AUTO: NEGATIVE
LYMPHOCYTES # BLD AUTO: 0.7 10*3/MM3 (ref 0.7–3.1)
LYMPHOCYTES NFR BLD AUTO: 11.1 % (ref 19.6–45.3)
MCH RBC QN AUTO: 30.1 PG (ref 26.6–33)
MCHC RBC AUTO-ENTMCNC: 33.9 G/DL (ref 31.5–35.7)
MCV RBC AUTO: 88.9 FL (ref 79–97)
MODALITY: ABNORMAL
MONOCYTES # BLD AUTO: 0.5 10*3/MM3 (ref 0.1–0.9)
MONOCYTES NFR BLD AUTO: 7.2 % (ref 5–12)
NEUTROPHILS NFR BLD AUTO: 5.3 10*3/MM3 (ref 1.7–7)
NEUTROPHILS NFR BLD AUTO: 80.4 % (ref 42.7–76)
NITRITE UR QL STRIP: NEGATIVE
NRBC BLD AUTO-RTO: 0 /100 WBC (ref 0–0.2)
PCO2 BLDA: 34.6 MM HG (ref 35–48)
PH BLDA: 7.42 PH UNITS (ref 7.35–7.45)
PH UR STRIP.AUTO: <=5 [PH] (ref 5–8)
PLATELET # BLD AUTO: 274 10*3/MM3 (ref 140–450)
PMV BLD AUTO: 7.5 FL (ref 6–12)
PO2 BLDA: 73.5 MM HG (ref 83–108)
POTASSIUM SERPL-SCNC: 3.8 MMOL/L (ref 3.5–5.2)
PROCALCITONIN SERPL-MCNC: 0.3 NG/ML (ref 0–0.25)
PROT SERPL-MCNC: 7.3 G/DL (ref 6–8.5)
PROT UR QL STRIP: ABNORMAL
RBC # BLD AUTO: 4.4 10*6/MM3 (ref 3.77–5.28)
RBC # UR: ABNORMAL /HPF
REF LAB TEST METHOD: ABNORMAL
RH BLD: POSITIVE
SAO2 % BLDCOA: 94.9 % (ref 94–98)
SODIUM SERPL-SCNC: 136 MMOL/L (ref 136–145)
SP GR UR STRIP: 1.01 (ref 1–1.03)
SQUAMOUS #/AREA URNS HPF: ABNORMAL /HPF
T&S EXPIRATION DATE: NORMAL
UROBILINOGEN UR QL STRIP: ABNORMAL
WBC # BLD AUTO: 6.6 10*3/MM3 (ref 3.4–10.8)
WBC UR QL AUTO: ABNORMAL /HPF

## 2021-01-23 PROCEDURE — 83615 LACTATE (LD) (LDH) ENZYME: CPT | Performed by: NURSE PRACTITIONER

## 2021-01-23 PROCEDURE — 86901 BLOOD TYPING SEROLOGIC RH(D): CPT

## 2021-01-23 PROCEDURE — 96374 THER/PROPH/DIAG INJ IV PUSH: CPT

## 2021-01-23 PROCEDURE — 86850 RBC ANTIBODY SCREEN: CPT | Performed by: NURSE PRACTITIONER

## 2021-01-23 PROCEDURE — 99284 EMERGENCY DEPT VISIT MOD MDM: CPT

## 2021-01-23 PROCEDURE — 85025 COMPLETE CBC W/AUTO DIFF WBC: CPT | Performed by: NURSE PRACTITIONER

## 2021-01-23 PROCEDURE — 86900 BLOOD TYPING SEROLOGIC ABO: CPT

## 2021-01-23 PROCEDURE — 71045 X-RAY EXAM CHEST 1 VIEW: CPT

## 2021-01-23 PROCEDURE — 25010000002 DEXAMETHASONE SODIUM PHOSPHATE 10 MG/ML SOLUTION: Performed by: NURSE PRACTITIONER

## 2021-01-23 PROCEDURE — 86901 BLOOD TYPING SEROLOGIC RH(D): CPT | Performed by: NURSE PRACTITIONER

## 2021-01-23 PROCEDURE — 99220 PR INITIAL OBSERVATION CARE/DAY 70 MINUTES: CPT | Performed by: NURSE PRACTITIONER

## 2021-01-23 PROCEDURE — 83605 ASSAY OF LACTIC ACID: CPT

## 2021-01-23 PROCEDURE — G0378 HOSPITAL OBSERVATION PER HR: HCPCS

## 2021-01-23 PROCEDURE — 82728 ASSAY OF FERRITIN: CPT | Performed by: NURSE PRACTITIONER

## 2021-01-23 PROCEDURE — 85379 FIBRIN DEGRADATION QUANT: CPT | Performed by: NURSE PRACTITIONER

## 2021-01-23 PROCEDURE — 86900 BLOOD TYPING SEROLOGIC ABO: CPT | Performed by: NURSE PRACTITIONER

## 2021-01-23 PROCEDURE — 36600 WITHDRAWAL OF ARTERIAL BLOOD: CPT

## 2021-01-23 PROCEDURE — P9612 CATHETERIZE FOR URINE SPEC: HCPCS

## 2021-01-23 PROCEDURE — 80053 COMPREHEN METABOLIC PANEL: CPT | Performed by: NURSE PRACTITIONER

## 2021-01-23 PROCEDURE — 82803 BLOOD GASES ANY COMBINATION: CPT

## 2021-01-23 PROCEDURE — 81001 URINALYSIS AUTO W/SCOPE: CPT | Performed by: NURSE PRACTITIONER

## 2021-01-23 PROCEDURE — 84145 PROCALCITONIN (PCT): CPT | Performed by: NURSE PRACTITIONER

## 2021-01-23 PROCEDURE — 86140 C-REACTIVE PROTEIN: CPT | Performed by: NURSE PRACTITIONER

## 2021-01-23 RX ORDER — ACETAMINOPHEN 650 MG/1
650 SUPPOSITORY RECTAL ONCE
Status: COMPLETED | OUTPATIENT
Start: 2021-01-23 | End: 2021-01-23

## 2021-01-23 RX ORDER — ONDANSETRON 4 MG/1
4 TABLET, FILM COATED ORAL EVERY 6 HOURS PRN
Status: DISCONTINUED | OUTPATIENT
Start: 2021-01-23 | End: 2021-01-25 | Stop reason: HOSPADM

## 2021-01-23 RX ORDER — ACETAMINOPHEN 160 MG/5ML
650 SOLUTION ORAL EVERY 4 HOURS PRN
Status: DISCONTINUED | OUTPATIENT
Start: 2021-01-23 | End: 2021-01-25 | Stop reason: HOSPADM

## 2021-01-23 RX ORDER — ACETAMINOPHEN 325 MG/1
650 TABLET ORAL EVERY 4 HOURS PRN
Status: DISCONTINUED | OUTPATIENT
Start: 2021-01-23 | End: 2021-01-25 | Stop reason: HOSPADM

## 2021-01-23 RX ORDER — ONDANSETRON 2 MG/ML
4 INJECTION INTRAMUSCULAR; INTRAVENOUS EVERY 6 HOURS PRN
Status: DISCONTINUED | OUTPATIENT
Start: 2021-01-23 | End: 2021-01-25 | Stop reason: HOSPADM

## 2021-01-23 RX ORDER — ACETAMINOPHEN 650 MG/1
650 SUPPOSITORY RECTAL EVERY 4 HOURS PRN
Status: DISCONTINUED | OUTPATIENT
Start: 2021-01-23 | End: 2021-01-25 | Stop reason: HOSPADM

## 2021-01-23 RX ORDER — DEXAMETHASONE SODIUM PHOSPHATE 10 MG/ML
6 INJECTION, SOLUTION INTRAMUSCULAR; INTRAVENOUS ONCE
Status: COMPLETED | OUTPATIENT
Start: 2021-01-23 | End: 2021-01-23

## 2021-01-23 RX ORDER — SODIUM CHLORIDE 0.9 % (FLUSH) 0.9 %
10 SYRINGE (ML) INJECTION EVERY 12 HOURS SCHEDULED
Status: DISCONTINUED | OUTPATIENT
Start: 2021-01-23 | End: 2021-01-25 | Stop reason: HOSPADM

## 2021-01-23 RX ORDER — SODIUM CHLORIDE 0.9 % (FLUSH) 0.9 %
10 SYRINGE (ML) INJECTION AS NEEDED
Status: DISCONTINUED | OUTPATIENT
Start: 2021-01-23 | End: 2021-01-25 | Stop reason: HOSPADM

## 2021-01-23 RX ORDER — DEXAMETHASONE SODIUM PHOSPHATE 10 MG/ML
10 INJECTION, SOLUTION INTRAMUSCULAR; INTRAVENOUS ONCE
Status: DISCONTINUED | OUTPATIENT
Start: 2021-01-23 | End: 2021-01-23

## 2021-01-23 RX ADMIN — Medication 10 ML: at 23:52

## 2021-01-23 RX ADMIN — DEXAMETHASONE SODIUM PHOSPHATE 6 MG: 10 INJECTION, SOLUTION INTRAMUSCULAR; INTRAVENOUS at 20:36

## 2021-01-23 RX ADMIN — SODIUM CHLORIDE 1000 ML: 9 INJECTION, SOLUTION INTRAVENOUS at 20:32

## 2021-01-23 RX ADMIN — ACETAMINOPHEN 650 MG: 650 SUPPOSITORY RECTAL at 20:47

## 2021-01-24 LAB
ANION GAP SERPL CALCULATED.3IONS-SCNC: 15 MMOL/L (ref 5–15)
BASOPHILS # BLD AUTO: 0 10*3/MM3 (ref 0–0.2)
BASOPHILS NFR BLD AUTO: 0.1 % (ref 0–1.5)
BUN SERPL-MCNC: 52 MG/DL (ref 8–23)
BUN/CREAT SERPL: 24.1 (ref 7–25)
CALCIUM SPEC-SCNC: 9.8 MG/DL (ref 8.6–10.5)
CHLORIDE SERPL-SCNC: 99 MMOL/L (ref 98–107)
CK SERPL-CCNC: 233 U/L (ref 20–180)
CO2 SERPL-SCNC: 21 MMOL/L (ref 22–29)
CREAT SERPL-MCNC: 2.16 MG/DL (ref 0.57–1)
DEPRECATED RDW RBC AUTO: 40.7 FL (ref 37–54)
EOSINOPHIL # BLD AUTO: 0 10*3/MM3 (ref 0–0.4)
EOSINOPHIL NFR BLD AUTO: 0.1 % (ref 0.3–6.2)
ERYTHROCYTE [DISTWIDTH] IN BLOOD BY AUTOMATED COUNT: 13.3 % (ref 12.3–15.4)
GFR SERPL CREATININE-BSD FRML MDRD: 23 ML/MIN/1.73
GLUCOSE BLDC GLUCOMTR-MCNC: 172 MG/DL (ref 70–105)
GLUCOSE BLDC GLUCOMTR-MCNC: 173 MG/DL (ref 70–105)
GLUCOSE BLDC GLUCOMTR-MCNC: 189 MG/DL (ref 70–105)
GLUCOSE BLDC GLUCOMTR-MCNC: 190 MG/DL (ref 70–105)
GLUCOSE SERPL-MCNC: 217 MG/DL (ref 65–99)
HBA1C MFR BLD: 7.3 % (ref 3.5–5.6)
HCT VFR BLD AUTO: 37.8 % (ref 34–46.6)
HGB BLD-MCNC: 12.9 G/DL (ref 12–15.9)
LYMPHOCYTES # BLD AUTO: 0.5 10*3/MM3 (ref 0.7–3.1)
LYMPHOCYTES NFR BLD AUTO: 9.7 % (ref 19.6–45.3)
MAGNESIUM SERPL-MCNC: 2 MG/DL (ref 1.6–2.4)
MCH RBC QN AUTO: 30.2 PG (ref 26.6–33)
MCHC RBC AUTO-ENTMCNC: 34.1 G/DL (ref 31.5–35.7)
MCV RBC AUTO: 88.4 FL (ref 79–97)
MONOCYTES # BLD AUTO: 0.2 10*3/MM3 (ref 0.1–0.9)
MONOCYTES NFR BLD AUTO: 2.8 % (ref 5–12)
NEUTROPHILS NFR BLD AUTO: 4.8 10*3/MM3 (ref 1.7–7)
NEUTROPHILS NFR BLD AUTO: 87.3 % (ref 42.7–76)
NRBC BLD AUTO-RTO: 0 /100 WBC (ref 0–0.2)
PLATELET # BLD AUTO: 271 10*3/MM3 (ref 140–450)
PMV BLD AUTO: 7.1 FL (ref 6–12)
POTASSIUM SERPL-SCNC: 3.9 MMOL/L (ref 3.5–5.2)
PROCALCITONIN SERPL-MCNC: 0.22 NG/ML (ref 0–0.25)
RBC # BLD AUTO: 4.28 10*6/MM3 (ref 3.77–5.28)
SODIUM SERPL-SCNC: 135 MMOL/L (ref 136–145)
TROPONIN T SERPL-MCNC: 0.04 NG/ML (ref 0–0.03)
TROPONIN T SERPL-MCNC: <0.01 NG/ML (ref 0–0.03)
TSH SERPL DL<=0.05 MIU/L-ACNC: 0.7 UIU/ML (ref 0.27–4.2)
WBC # BLD AUTO: 5.5 10*3/MM3 (ref 3.4–10.8)

## 2021-01-24 PROCEDURE — 80048 BASIC METABOLIC PNL TOTAL CA: CPT | Performed by: NURSE PRACTITIONER

## 2021-01-24 PROCEDURE — 84484 ASSAY OF TROPONIN QUANT: CPT | Performed by: NURSE PRACTITIONER

## 2021-01-24 PROCEDURE — 25010000002 HEPARIN (PORCINE) PER 1000 UNITS: Performed by: NURSE PRACTITIONER

## 2021-01-24 PROCEDURE — 93010 ELECTROCARDIOGRAM REPORT: CPT | Performed by: INTERNAL MEDICINE

## 2021-01-24 PROCEDURE — 99225 PR SBSQ OBSERVATION CARE/DAY 25 MINUTES: CPT | Performed by: HOSPITALIST

## 2021-01-24 PROCEDURE — 83036 HEMOGLOBIN GLYCOSYLATED A1C: CPT | Performed by: NURSE PRACTITIONER

## 2021-01-24 PROCEDURE — 82962 GLUCOSE BLOOD TEST: CPT

## 2021-01-24 PROCEDURE — 83735 ASSAY OF MAGNESIUM: CPT | Performed by: HOSPITALIST

## 2021-01-24 PROCEDURE — 25010000002 DEXAMETHASONE PER 1 MG: Performed by: NURSE PRACTITIONER

## 2021-01-24 PROCEDURE — 63710000001 INSULIN LISPRO (HUMAN) PER 5 UNITS: Performed by: NURSE PRACTITIONER

## 2021-01-24 PROCEDURE — 82550 ASSAY OF CK (CPK): CPT | Performed by: NURSE PRACTITIONER

## 2021-01-24 PROCEDURE — 96372 THER/PROPH/DIAG INJ SC/IM: CPT

## 2021-01-24 PROCEDURE — 84484 ASSAY OF TROPONIN QUANT: CPT | Performed by: HOSPITALIST

## 2021-01-24 PROCEDURE — 84443 ASSAY THYROID STIM HORMONE: CPT | Performed by: NURSE PRACTITIONER

## 2021-01-24 PROCEDURE — G0378 HOSPITAL OBSERVATION PER HR: HCPCS

## 2021-01-24 PROCEDURE — 85025 COMPLETE CBC W/AUTO DIFF WBC: CPT | Performed by: NURSE PRACTITIONER

## 2021-01-24 PROCEDURE — 93005 ELECTROCARDIOGRAM TRACING: CPT | Performed by: HOSPITALIST

## 2021-01-24 PROCEDURE — 96376 TX/PRO/DX INJ SAME DRUG ADON: CPT

## 2021-01-24 PROCEDURE — 84145 PROCALCITONIN (PCT): CPT | Performed by: HOSPITALIST

## 2021-01-24 RX ORDER — CHLORTHALIDONE 25 MG/1
25 TABLET ORAL DAILY
Status: DISCONTINUED | OUTPATIENT
Start: 2021-01-24 | End: 2021-01-24

## 2021-01-24 RX ORDER — DEXAMETHASONE SODIUM PHOSPHATE 4 MG/ML
6 INJECTION, SOLUTION INTRA-ARTICULAR; INTRALESIONAL; INTRAMUSCULAR; INTRAVENOUS; SOFT TISSUE DAILY
Status: DISCONTINUED | OUTPATIENT
Start: 2021-01-24 | End: 2021-01-24

## 2021-01-24 RX ORDER — QUETIAPINE FUMARATE 25 MG/1
25 TABLET, FILM COATED ORAL NIGHTLY
Status: DISCONTINUED | OUTPATIENT
Start: 2021-01-24 | End: 2021-01-25 | Stop reason: HOSPADM

## 2021-01-24 RX ORDER — COLESEVELAM 180 1/1
1250 TABLET ORAL 2 TIMES DAILY WITH MEALS
Status: DISCONTINUED | OUTPATIENT
Start: 2021-01-24 | End: 2021-01-25 | Stop reason: HOSPADM

## 2021-01-24 RX ORDER — SODIUM CHLORIDE 9 MG/ML
50 INJECTION, SOLUTION INTRAVENOUS CONTINUOUS
Status: DISCONTINUED | OUTPATIENT
Start: 2021-01-24 | End: 2021-01-25 | Stop reason: HOSPADM

## 2021-01-24 RX ORDER — DEXTROSE MONOHYDRATE 25 G/50ML
25 INJECTION, SOLUTION INTRAVENOUS
Status: DISCONTINUED | OUTPATIENT
Start: 2021-01-24 | End: 2021-01-25 | Stop reason: HOSPADM

## 2021-01-24 RX ORDER — HEPARIN SODIUM 5000 [USP'U]/ML
7500 INJECTION, SOLUTION INTRAVENOUS; SUBCUTANEOUS EVERY 12 HOURS SCHEDULED
Status: DISCONTINUED | OUTPATIENT
Start: 2021-01-24 | End: 2021-01-25

## 2021-01-24 RX ORDER — NICOTINE POLACRILEX 4 MG
15 LOZENGE BUCCAL
Status: DISCONTINUED | OUTPATIENT
Start: 2021-01-24 | End: 2021-01-25 | Stop reason: HOSPADM

## 2021-01-24 RX ORDER — DEXAMETHASONE 6 MG/1
6 TABLET ORAL
Status: DISCONTINUED | OUTPATIENT
Start: 2021-01-25 | End: 2021-01-25 | Stop reason: HOSPADM

## 2021-01-24 RX ORDER — BUPROPION HYDROCHLORIDE 75 MG/1
75 TABLET ORAL EVERY MORNING
Status: DISCONTINUED | OUTPATIENT
Start: 2021-01-24 | End: 2021-01-25 | Stop reason: HOSPADM

## 2021-01-24 RX ORDER — INSULIN LISPRO 100 [IU]/ML
0-9 INJECTION, SOLUTION INTRAVENOUS; SUBCUTANEOUS AS NEEDED
Status: DISCONTINUED | OUTPATIENT
Start: 2021-01-24 | End: 2021-01-25 | Stop reason: HOSPADM

## 2021-01-24 RX ORDER — INSULIN LISPRO 100 [IU]/ML
0-9 INJECTION, SOLUTION INTRAVENOUS; SUBCUTANEOUS
Status: DISCONTINUED | OUTPATIENT
Start: 2021-01-24 | End: 2021-01-25 | Stop reason: HOSPADM

## 2021-01-24 RX ADMIN — COLESEVELAM HYDROCHLORIDE 1250 MG: 625 TABLET, FILM COATED ORAL at 20:46

## 2021-01-24 RX ADMIN — METOPROLOL TARTRATE 25 MG: 25 TABLET, FILM COATED ORAL at 20:47

## 2021-01-24 RX ADMIN — INSULIN LISPRO 2 UNITS: 100 INJECTION, SOLUTION INTRAVENOUS; SUBCUTANEOUS at 13:24

## 2021-01-24 RX ADMIN — DEXAMETHASONE SODIUM PHOSPHATE 6 MG: 4 INJECTION, SOLUTION INTRAMUSCULAR; INTRAVENOUS at 09:58

## 2021-01-24 RX ADMIN — HEPARIN SODIUM 7500 UNITS: 5000 INJECTION INTRAVENOUS; SUBCUTANEOUS at 09:58

## 2021-01-24 RX ADMIN — QUETIAPINE FUMARATE 25 MG: 25 TABLET ORAL at 20:46

## 2021-01-24 RX ADMIN — BUPROPION HYDROCHLORIDE 75 MG: 75 TABLET, FILM COATED ORAL at 05:31

## 2021-01-24 RX ADMIN — Medication 10 ML: at 09:58

## 2021-01-24 RX ADMIN — INSULIN LISPRO 2 UNITS: 100 INJECTION, SOLUTION INTRAVENOUS; SUBCUTANEOUS at 09:57

## 2021-01-24 RX ADMIN — METOPROLOL TARTRATE 25 MG: 25 TABLET, FILM COATED ORAL at 09:58

## 2021-01-24 RX ADMIN — LINAGLIPTIN 5 MG: 5 TABLET, FILM COATED ORAL at 09:58

## 2021-01-24 RX ADMIN — QUETIAPINE FUMARATE 25 MG: 25 TABLET ORAL at 02:21

## 2021-01-24 RX ADMIN — Medication 10 ML: at 20:46

## 2021-01-24 RX ADMIN — HEPARIN SODIUM 7500 UNITS: 5000 INJECTION INTRAVENOUS; SUBCUTANEOUS at 20:47

## 2021-01-24 RX ADMIN — INSULIN LISPRO 2 UNITS: 100 INJECTION, SOLUTION INTRAVENOUS; SUBCUTANEOUS at 17:57

## 2021-01-25 ENCOUNTER — READMISSION MANAGEMENT (OUTPATIENT)
Dept: CALL CENTER | Facility: HOSPITAL | Age: 71
End: 2021-01-25

## 2021-01-25 VITALS
WEIGHT: 244.49 LBS | DIASTOLIC BLOOD PRESSURE: 55 MMHG | HEART RATE: 85 BPM | OXYGEN SATURATION: 94 % | HEIGHT: 62 IN | BODY MASS INDEX: 44.99 KG/M2 | TEMPERATURE: 97.5 F | SYSTOLIC BLOOD PRESSURE: 128 MMHG | RESPIRATION RATE: 16 BRPM

## 2021-01-25 LAB
ANION GAP SERPL CALCULATED.3IONS-SCNC: 14 MMOL/L (ref 5–15)
BASOPHILS # BLD AUTO: 0 10*3/MM3 (ref 0–0.2)
BASOPHILS NFR BLD AUTO: 0.2 % (ref 0–1.5)
BUN SERPL-MCNC: 39 MG/DL (ref 8–23)
BUN/CREAT SERPL: 36.1 (ref 7–25)
CALCIUM SPEC-SCNC: 9.7 MG/DL (ref 8.6–10.5)
CHLORIDE SERPL-SCNC: 100 MMOL/L (ref 98–107)
CO2 SERPL-SCNC: 26 MMOL/L (ref 22–29)
CREAT SERPL-MCNC: 1.08 MG/DL (ref 0.57–1)
DEPRECATED RDW RBC AUTO: 40.3 FL (ref 37–54)
EOSINOPHIL # BLD AUTO: 0 10*3/MM3 (ref 0–0.4)
EOSINOPHIL NFR BLD AUTO: 0.5 % (ref 0.3–6.2)
ERYTHROCYTE [DISTWIDTH] IN BLOOD BY AUTOMATED COUNT: 13.2 % (ref 12.3–15.4)
GFR SERPL CREATININE-BSD FRML MDRD: 50 ML/MIN/1.73
GLUCOSE BLDC GLUCOMTR-MCNC: 133 MG/DL (ref 70–105)
GLUCOSE BLDC GLUCOMTR-MCNC: 167 MG/DL (ref 70–105)
GLUCOSE SERPL-MCNC: 140 MG/DL (ref 65–99)
HCT VFR BLD AUTO: 38.7 % (ref 34–46.6)
HGB BLD-MCNC: 13.3 G/DL (ref 12–15.9)
LYMPHOCYTES # BLD AUTO: 1.2 10*3/MM3 (ref 0.7–3.1)
LYMPHOCYTES NFR BLD AUTO: 19.3 % (ref 19.6–45.3)
MAGNESIUM SERPL-MCNC: 1.9 MG/DL (ref 1.6–2.4)
MCH RBC QN AUTO: 30.1 PG (ref 26.6–33)
MCHC RBC AUTO-ENTMCNC: 34.4 G/DL (ref 31.5–35.7)
MCV RBC AUTO: 87.7 FL (ref 79–97)
MONOCYTES # BLD AUTO: 0.5 10*3/MM3 (ref 0.1–0.9)
MONOCYTES NFR BLD AUTO: 8.2 % (ref 5–12)
NEUTROPHILS NFR BLD AUTO: 4.5 10*3/MM3 (ref 1.7–7)
NEUTROPHILS NFR BLD AUTO: 71.8 % (ref 42.7–76)
NRBC BLD AUTO-RTO: 0.2 /100 WBC (ref 0–0.2)
PLATELET # BLD AUTO: 316 10*3/MM3 (ref 140–450)
PMV BLD AUTO: 6.9 FL (ref 6–12)
POTASSIUM SERPL-SCNC: 3.6 MMOL/L (ref 3.5–5.2)
RBC # BLD AUTO: 4.42 10*6/MM3 (ref 3.77–5.28)
SODIUM SERPL-SCNC: 140 MMOL/L (ref 136–145)
WBC # BLD AUTO: 6.3 10*3/MM3 (ref 3.4–10.8)

## 2021-01-25 PROCEDURE — 82962 GLUCOSE BLOOD TEST: CPT

## 2021-01-25 PROCEDURE — 96372 THER/PROPH/DIAG INJ SC/IM: CPT

## 2021-01-25 PROCEDURE — 25010000002 HEPARIN (PORCINE) PER 1000 UNITS: Performed by: NURSE PRACTITIONER

## 2021-01-25 PROCEDURE — G0378 HOSPITAL OBSERVATION PER HR: HCPCS

## 2021-01-25 PROCEDURE — 80048 BASIC METABOLIC PNL TOTAL CA: CPT | Performed by: HOSPITALIST

## 2021-01-25 PROCEDURE — 63710000001 INSULIN LISPRO (HUMAN) PER 5 UNITS: Performed by: NURSE PRACTITIONER

## 2021-01-25 PROCEDURE — 83735 ASSAY OF MAGNESIUM: CPT | Performed by: HOSPITALIST

## 2021-01-25 PROCEDURE — 99217 PR OBSERVATION CARE DISCHARGE MANAGEMENT: CPT | Performed by: HOSPITALIST

## 2021-01-25 PROCEDURE — 85025 COMPLETE CBC W/AUTO DIFF WBC: CPT | Performed by: HOSPITALIST

## 2021-01-25 RX ORDER — DEXAMETHASONE 6 MG/1
6 TABLET ORAL
Qty: 3 TABLET | Refills: 0 | Status: SHIPPED | OUTPATIENT
Start: 2021-01-26 | End: 2021-02-01

## 2021-01-25 RX ADMIN — COLESEVELAM HYDROCHLORIDE 1250 MG: 625 TABLET, FILM COATED ORAL at 09:33

## 2021-01-25 RX ADMIN — METOPROLOL TARTRATE 25 MG: 25 TABLET, FILM COATED ORAL at 09:33

## 2021-01-25 RX ADMIN — Medication 10 ML: at 09:33

## 2021-01-25 RX ADMIN — LINAGLIPTIN 5 MG: 5 TABLET, FILM COATED ORAL at 09:33

## 2021-01-25 RX ADMIN — INSULIN LISPRO 2 UNITS: 100 INJECTION, SOLUTION INTRAVENOUS; SUBCUTANEOUS at 12:42

## 2021-01-25 RX ADMIN — HEPARIN SODIUM 7500 UNITS: 5000 INJECTION INTRAVENOUS; SUBCUTANEOUS at 09:32

## 2021-01-25 RX ADMIN — DEXAMETHASONE 6 MG: 6 TABLET ORAL at 09:33

## 2021-01-25 RX ADMIN — BUPROPION HYDROCHLORIDE 75 MG: 75 TABLET, FILM COATED ORAL at 09:33

## 2021-01-26 ENCOUNTER — TRANSITIONAL CARE MANAGEMENT TELEPHONE ENCOUNTER (OUTPATIENT)
Dept: CALL CENTER | Facility: HOSPITAL | Age: 71
End: 2021-01-26

## 2021-01-26 NOTE — OUTREACH NOTE
Call Center TCM Note      Responses   [facility] facility patient discharged from?  [facility]   Does the patient have one of the following disease processes/diagnoses(primary or secondary)?  COVID-19   COVID-19 underlying condition?  None   TCM attempt successful?  No   Unsuccessful attempts  Attempt 2   Discharge diagnosis  Covid, fever          Marianne Solomon LPN    1/26/2021, 16:45 EST

## 2021-01-26 NOTE — OUTREACH NOTE
Prep Survey      Responses   [facility] facility patient discharged from?  [facility]   Is LACE score < 7 ?  No   Emergency Room discharge w/ pulse ox?  No   Eligibility  [facility]   Date of Admission  01/23/21   Date of Discharge  01/25/21   Discharge Disposition  Home or Self Care   Discharge diagnosis  Covid, fever   Does the patient have one of the following disease processes/diagnoses(primary or secondary)?  COVID-19   Does the patient have Home health ordered?  No   Is there a DME ordered?  No   Prep survey completed?  Yes          Babita Vasquez RN

## 2021-01-26 NOTE — OUTREACH NOTE
Call Center TCM Note      Responses   [facility] facility patient discharged from?  [facility]   Does the patient have one of the following disease processes/diagnoses(primary or secondary)?  COVID-19   COVID-19 underlying condition?  None   TCM attempt successful?  No   Unsuccessful attempts  Attempt 1   Discharge diagnosis  Covid, fever          Marianne Solomon LPN    1/26/2021, 08:55 EST

## 2021-01-27 ENCOUNTER — TRANSITIONAL CARE MANAGEMENT TELEPHONE ENCOUNTER (OUTPATIENT)
Dept: CALL CENTER | Facility: HOSPITAL | Age: 71
End: 2021-01-27

## 2021-01-27 NOTE — OUTREACH NOTE
Call Center TCM Note      Responses   [facility] patient discharged from?  [facility]   Does the patient have one of the following disease processes/diagnoses(primary or secondary)?  COVID-19   COVID-19 underlying condition?  None   TCM attempt successful?  No [verbal release is over a year old]   Unsuccessful attempts  Attempt 3   Discharge diagnosis  Covid, fever          Margot LEILA Alvarado RN    1/27/2021, 08:29 EST

## 2021-01-28 ENCOUNTER — READMISSION MANAGEMENT (OUTPATIENT)
Dept: CALL CENTER | Facility: HOSPITAL | Age: 71
End: 2021-01-28

## 2021-01-28 NOTE — OUTREACH NOTE
COVID-19 Week 1 Survey      Responses   [facility] patient discharged from?  [facility]   Does the patient have one of the following disease processes/diagnoses(primary or secondary)?  COVID-19   COVID-19 underlying condition?  None   Call Number  Call 2   Week 1 Call successful?  No   Discharge diagnosis  Covid, fever          Noa Araya RN

## 2021-01-29 ENCOUNTER — READMISSION MANAGEMENT (OUTPATIENT)
Dept: CALL CENTER | Facility: HOSPITAL | Age: 71
End: 2021-01-29

## 2021-01-29 NOTE — OUTREACH NOTE
COVID-19 Week 1 Survey      Responses   [facility] patient discharged from?  [facility]   Does the patient have one of the following disease processes/diagnoses(primary or secondary)?  COVID-19   COVID-19 underlying condition?  None   Call Number  Call 3   Week 1 Call successful?  No   Discharge diagnosis  Covid, fever          Alba Skaggs RN

## 2021-02-01 ENCOUNTER — TELEMEDICINE (OUTPATIENT)
Dept: FAMILY MEDICINE CLINIC | Facility: CLINIC | Age: 71
End: 2021-02-01

## 2021-02-01 DIAGNOSIS — U07.1 COVID-19 VIRUS INFECTION: Primary | ICD-10-CM
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
## 2021-02-01 DIAGNOSIS — I10 ESSENTIAL HYPERTENSION: ICD-10-CM

## 2021-02-01 DIAGNOSIS — N17.9 ACUTE RENAL FAILURE, UNSPECIFIED ACUTE RENAL FAILURE TYPE (HCC): ICD-10-CM
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
## 2021-02-01 PROCEDURE — 99496 TRANSJ CARE MGMT HIGH F2F 7D: CPT | Performed by: FAMILY MEDICINE

## 2021-02-01 NOTE — ASSESSMENT & PLAN NOTE
Renal condition is improving with treatment.  Continue current treatment regimen.  Renal condition will be reassessed in 3 months.

## 2021-02-01 NOTE — PROGRESS NOTES
Transitional Care Follow Up Visit  Subjective     Anais West is a 70 y.o. female who presents for a transitional care management visit.    Within 48 business hours after discharge our office contacted her via telephone to coordinate her care and needs.      I reviewed and discussed the details of that call along with the discharge summary, hospital problems, inpatient lab results, inpatient diagnostic studies, and consultation reports with Anais.     Current outpatient and discharge medications have been reconciled for the patient.  Reviewed by: Angelia Springer MD    You have chosen to receive care through a telehealth visit.  Do you consent to use a video/audio connection for your medical care today? Yes      Date of TCM Phone Call 1/25/2021   [facility]   Date of Admission 1/23/2021   Date of Discharge 1/25/2021   Discharge Disposition Home or Self Care     Risk for Readmission (LACE) Score: 7 (1/25/2021  6:00 AM)      She tested positive for COVID19 on 1/18/21.  Her  was also ill and was admitted to the hospital.  While he was in the hospital she went out to her garage and fell.  She could not get up for 2 hours.  She was finally able to get back in her house.  On 1/23/21 she was feeling flushed and had trouble walking so she went to the ER where she was admitted for dehydration, acute kidney injury and COVID.  She spent two days there.  While there she was treated with oral steroids and her chlorthalidone was stopped.  She is now feeling better except for bruising from her fall.      Course During Hospital Stay:  See above     The following portions of the patient's history were reviewed and updated as appropriate: allergies, current medications, past family history, past medical history, past social history, past surgical history and problem list.    Review of Systems   Constitutional: Positive for fatigue.   HENT: Negative for congestion.    Respiratory: Negative for cough,  chest tightness, shortness of breath and wheezing.    Cardiovascular: Negative for chest pain, palpitations and leg swelling.   Hematological: Bruises/bleeds easily.       Objective   Physical Exam  Constitutional:       Appearance: Normal appearance.   HENT:      Head: Normocephalic and atraumatic.   Pulmonary:      Effort: Pulmonary effort is normal.   Neurological:      General: No focal deficit present.      Mental Status: She is alert.   Psychiatric:         Mood and Affect: Mood normal.         Assessment/Plan   Diagnoses and all orders for this visit:    1. COVID-19 virus infection (Primary)  Assessment & Plan:  Improving.      2. Acute renal failure, unspecified acute renal failure type (CMS/HCC)  Assessment & Plan:  Renal condition is improving with treatment.  Continue current treatment regimen.  Renal condition will be reassessed in 3 months.      3. Fall in home, subsequent encounter  Assessment & Plan:  Bruises are healing per patient.  Patient aware to always have her cell phone with her to call someone in case of emergency.      4. Essential hypertension  Assessment & Plan:  Monitor BP at home since chlorthalidone was stopped.

## 2021-02-01 NOTE — ASSESSMENT & PLAN NOTE
Bruises are healing per patient.  Patient aware to always have her cell phone with her to call someone in case of emergency.

## 2021-02-05 LAB — QT INTERVAL: 374 MS

## 2021-02-05 RX ORDER — LOSARTAN POTASSIUM 100 MG/1
TABLET ORAL
Qty: 30 TABLET | Refills: 5 | Status: SHIPPED | OUTPATIENT
Start: 2021-02-05 | End: 2021-08-13 | Stop reason: SDUPTHER

## 2021-02-05 RX ORDER — CHLORTHALIDONE 25 MG/1
TABLET ORAL
Qty: 90 TABLET | Refills: 3 | Status: SHIPPED | OUTPATIENT
Start: 2021-02-05 | End: 2021-06-23

## 2021-03-08 RX ORDER — SITAGLIPTIN 50 MG/1
TABLET, FILM COATED ORAL
Qty: 30 TABLET | Refills: 4 | Status: SHIPPED | OUTPATIENT
Start: 2021-03-08 | End: 2021-08-13 | Stop reason: SDUPTHER

## 2021-03-24 ENCOUNTER — TELEPHONE (OUTPATIENT)
Dept: GASTROENTEROLOGY | Facility: CLINIC | Age: 71
End: 2021-03-24

## 2021-03-24 ENCOUNTER — OFFICE VISIT (OUTPATIENT)
Dept: GASTROENTEROLOGY | Facility: CLINIC | Age: 71
End: 2021-03-24

## 2021-03-24 VITALS — WEIGHT: 247 LBS | BODY MASS INDEX: 45.45 KG/M2 | HEIGHT: 62 IN | TEMPERATURE: 97.5 F

## 2021-03-24 DIAGNOSIS — D12.6 ADENOMATOUS POLYP OF COLON, UNSPECIFIED PART OF COLON: Primary | ICD-10-CM

## 2021-03-24 DIAGNOSIS — R19.4 CHANGE IN BOWEL HABITS: ICD-10-CM

## 2021-03-24 DIAGNOSIS — Z87.19 HISTORY OF ACUTE GASTRITIS: ICD-10-CM

## 2021-03-24 DIAGNOSIS — K21.9 GASTROESOPHAGEAL REFLUX DISEASE WITHOUT ESOPHAGITIS: ICD-10-CM

## 2021-03-24 PROCEDURE — 99214 OFFICE O/P EST MOD 30 MIN: CPT | Performed by: NURSE PRACTITIONER

## 2021-03-24 NOTE — PROGRESS NOTES
Chief Complaint   Patient presents with   • GI Problem     wants c/s       Anais West is a  70 y.o. female here for a follow up visit for constipation.    HPI  70-year-old female presents today for follow-up visit for constipation.  She is a patient of Dr. Marcum.  She was last seen in the office on 8/31/2018.  She is new to me today.  She used to have a history of constipation and use to have to take MiraLAX every day.  She tells me lately everything is seem to have changed and now she is having looser stools.  She does have a bowel movement every day so she is no longer needing to take the MiraLAX.  Her last EGD and colonoscopy was attempted on 5/1/2018.  But unfortunately it had to be stopped due to her having an irregular EKG.  She does have a history of adenomatous colon polyps.  She has done an EGD with dilation in the past and found to have gastritis.  She tells me that since changing her diet she does not take anything for reflux.  She is planning to follow-up with cardiology on 4/13/2021 to make sure that she has their clearance to do these procedures that she wants to do.  She denies any dysphagia, reflux, abdominal pain, nausea and vomiting, constipation, rectal bleeding or melena.  She is appetite is good and her weight is stable.  She denies any significant GI family history at this time.  She does admit now her stools are much looser and she may have some gas but she also takes Metformin and she admits these bowel movements are worse after she drinks coffee in the morning.  Past Medical History:   Diagnosis Date   • Abdominal pain    • Anxiety    • Cholelithiasis 1976    surgery   • Depression    • Diabetes mellitus (CMS/HCC)    • QUIÑONES (dyspnea on exertion)    • Excessive daytime sleepiness    • Heat intolerance    • Hiatal hernia    • History of transfusion    • Hyperlipidemia    • Hypertension    • Joint pain    • Malaise and fatigue    • Myalgia    • Nervously anxious    • Sleep apnea     CPAP    • SOB (shortness of breath)    • Ventricular bigeminy 11/2017       Past Surgical History:   Procedure Laterality Date   • CHOLECYSTECTOMY     • COLONOSCOPY  06/10/2011    One 7 mm polyp in the transverse colon, non-bleeding internal hemorrhoids   • COLONOSCOPY N/A 5/1/2018    IH, polyps (TAs)   • ENDOSCOPY N/A 5/1/2018    Gastritis   • HYSTERECTOMY     • UPPER GASTROINTESTINAL ENDOSCOPY  06/10/2011    Hiatus hernia, gastric mucosal abnormality characterized by erythema       Scheduled Meds:    Continuous Infusions:No current facility-administered medications for this visit.      PRN Meds:.    Allergies   Allergen Reactions   • Penicillins Anaphylaxis   • Sulfa Antibiotics Hives       Social History     Socioeconomic History   • Marital status:      Spouse name: Not on file   • Number of children: Not on file   • Years of education: Not on file   • Highest education level: Not on file   Tobacco Use   • Smoking status: Never Smoker   • Smokeless tobacco: Never Used   Vaping Use   • Vaping Use: Never used   Substance and Sexual Activity   • Alcohol use: No     Comment: Daily caffeine use   • Drug use: No   • Sexual activity: Defer     Partners: Male     Birth control/protection: Post-menopausal       Family History   Problem Relation Age of Onset   • Atrial fibrillation Mother    • Heart disease Father    • Heart attack Brother    • Heart disease Brother    • Heart disease Maternal Grandmother    • Heart disease Brother    • Malig Hyperthermia Neg Hx        Review of Systems   Constitutional: Negative for appetite change, chills, diaphoresis, fatigue, fever and unexpected weight change.   HENT: Negative for nosebleeds, postnasal drip, sore throat, trouble swallowing and voice change.    Respiratory: Negative for cough, choking, chest tightness, shortness of breath and wheezing.    Cardiovascular: Negative for chest pain, palpitations and leg swelling.   Gastrointestinal: Positive for abdominal distention.  Negative for abdominal pain, anal bleeding, blood in stool, constipation, diarrhea, nausea, rectal pain and vomiting.   Endocrine: Negative for polydipsia, polyphagia and polyuria.   Musculoskeletal: Negative for gait problem.   Skin: Negative for rash and wound.   Allergic/Immunologic: Negative for food allergies.   Neurological: Negative for dizziness, speech difficulty and light-headedness.   Psychiatric/Behavioral: Negative for confusion, self-injury, sleep disturbance and suicidal ideas.       Vitals:    03/24/21 1257   Temp: 97.5 °F (36.4 °C)       Physical Exam  Constitutional:       General: She is not in acute distress.     Appearance: She is well-developed. She is not ill-appearing.   HENT:      Head: Normocephalic.   Eyes:      Pupils: Pupils are equal, round, and reactive to light.   Cardiovascular:      Rate and Rhythm: Normal rate and regular rhythm.      Heart sounds: Normal heart sounds.   Pulmonary:      Effort: Pulmonary effort is normal.      Breath sounds: Normal breath sounds.   Abdominal:      General: Bowel sounds are normal. There is distension.      Palpations: Abdomen is soft. There is no mass.      Tenderness: There is no abdominal tenderness. There is no guarding or rebound.      Hernia: No hernia is present.   Musculoskeletal:         General: Normal range of motion.   Skin:     General: Skin is warm and dry.   Neurological:      Mental Status: She is alert and oriented to person, place, and time.   Psychiatric:         Speech: Speech normal.         Behavior: Behavior normal.         Judgment: Judgment normal.         No radiology results for the last 7 days     Assessment and plan     1. Adenomatous polyp of colon, unspecified part of colon  - Case Request; Standing  - Case Request    2. Change in bowel habits  - Case Request; Standing  - Case Request    3. Gastroesophageal reflux disease without esophagitis  - Case Request; Standing  - Case Request    4. History of acute gastritis  -  Case Request; Standing  - Case Request    Given her history of adenomatous colon polyps that she is due for another colonoscopy.  Since she was not having any reflux symptoms when she had her EGD with dilation last time that showed gastritis the patient is fearful that we might miss something so she wants to have another EGD done.  So I will go ahead and have her do both scopes at the same time.  Patient is agreeable to the scopes. Since she did have those scopes that were previously canceled due to an irregular heart beat patient does have an appointment scheduled on April 13 to see her cardiologist.  So we will schedule her scopes after that date so that she can make sure she gets cardiac clearance before doing the scopes again.  For now for her change in bowel habits I think this is most likely due to her food choices, caffeine in the morning and her Metformin.  She can try taking the Metformin at night and then she can also add a daily fiber supplement.  Patient to call the office next week with an update.  Patient to follow-up with me after her scopes.  Patient is agreeable to the plan.

## 2021-04-26 ENCOUNTER — TRANSCRIBE ORDERS (OUTPATIENT)
Dept: PULMONOLOGY | Facility: HOSPITAL | Age: 71
End: 2021-04-26

## 2021-04-26 DIAGNOSIS — Z01.818 OTHER SPECIFIED PRE-OPERATIVE EXAMINATION: Primary | ICD-10-CM

## 2021-04-30 RX ORDER — LAMOTRIGINE 200 MG/1
TABLET ORAL
COMMUNITY
Start: 2021-03-15

## 2021-05-01 ENCOUNTER — LAB (OUTPATIENT)
Dept: LAB | Facility: HOSPITAL | Age: 71
End: 2021-05-01

## 2021-05-01 DIAGNOSIS — Z01.818 OTHER SPECIFIED PRE-OPERATIVE EXAMINATION: ICD-10-CM

## 2021-05-01 PROCEDURE — U0004 COV-19 TEST NON-CDC HGH THRU: HCPCS

## 2021-05-01 PROCEDURE — C9803 HOPD COVID-19 SPEC COLLECT: HCPCS

## 2021-05-02 LAB — SARS-COV-2 ORF1AB RESP QL NAA+PROBE: NOT DETECTED

## 2021-05-04 ENCOUNTER — ANESTHESIA EVENT (OUTPATIENT)
Dept: GASTROENTEROLOGY | Facility: HOSPITAL | Age: 71
End: 2021-05-04

## 2021-05-04 ENCOUNTER — ANESTHESIA (OUTPATIENT)
Dept: GASTROENTEROLOGY | Facility: HOSPITAL | Age: 71
End: 2021-05-04

## 2021-05-04 ENCOUNTER — HOSPITAL ENCOUNTER (OUTPATIENT)
Facility: HOSPITAL | Age: 71
Setting detail: HOSPITAL OUTPATIENT SURGERY
Discharge: HOME | End: 2021-05-04
Attending: INTERNAL MEDICINE | Admitting: INTERNAL MEDICINE

## 2021-05-04 VITALS
HEIGHT: 62 IN | SYSTOLIC BLOOD PRESSURE: 136 MMHG | DIASTOLIC BLOOD PRESSURE: 66 MMHG | BODY MASS INDEX: 45.82 KG/M2 | HEART RATE: 88 BPM | WEIGHT: 249 LBS | OXYGEN SATURATION: 98 % | RESPIRATION RATE: 16 BRPM

## 2021-05-04 DIAGNOSIS — K21.9 GASTROESOPHAGEAL REFLUX DISEASE WITHOUT ESOPHAGITIS: ICD-10-CM

## 2021-05-04 DIAGNOSIS — D12.6 ADENOMATOUS POLYP OF COLON, UNSPECIFIED PART OF COLON: ICD-10-CM

## 2021-05-04 DIAGNOSIS — Z87.19 HISTORY OF ACUTE GASTRITIS: ICD-10-CM

## 2021-05-04 DIAGNOSIS — R19.4 CHANGE IN BOWEL HABITS: ICD-10-CM

## 2021-05-04 LAB — GLUCOSE BLDC GLUCOMTR-MCNC: 159 MG/DL (ref 70–130)

## 2021-05-04 PROCEDURE — 88305 TISSUE EXAM BY PATHOLOGIST: CPT | Performed by: INTERNAL MEDICINE

## 2021-05-04 PROCEDURE — 25010000002 PROPOFOL 10 MG/ML EMULSION: Performed by: ANESTHESIOLOGY

## 2021-05-04 PROCEDURE — 43239 EGD BIOPSY SINGLE/MULTIPLE: CPT | Performed by: INTERNAL MEDICINE

## 2021-05-04 PROCEDURE — 45380 COLONOSCOPY AND BIOPSY: CPT | Performed by: INTERNAL MEDICINE

## 2021-05-04 PROCEDURE — 82962 GLUCOSE BLOOD TEST: CPT

## 2021-05-04 PROCEDURE — 25010000002 PHENYLEPHRINE PER 1 ML: Performed by: ANESTHESIOLOGY

## 2021-05-04 PROCEDURE — S0260 H&P FOR SURGERY: HCPCS | Performed by: INTERNAL MEDICINE

## 2021-05-04 RX ORDER — GLYCOPYRROLATE 0.2 MG/ML
INJECTION INTRAMUSCULAR; INTRAVENOUS AS NEEDED
Status: DISCONTINUED | OUTPATIENT
Start: 2021-05-04 | End: 2021-05-04 | Stop reason: SURG

## 2021-05-04 RX ORDER — PROPOFOL 10 MG/ML
VIAL (ML) INTRAVENOUS AS NEEDED
Status: DISCONTINUED | OUTPATIENT
Start: 2021-05-04 | End: 2021-05-04 | Stop reason: SURG

## 2021-05-04 RX ORDER — SODIUM CHLORIDE, SODIUM LACTATE, POTASSIUM CHLORIDE, CALCIUM CHLORIDE 600; 310; 30; 20 MG/100ML; MG/100ML; MG/100ML; MG/100ML
1000 INJECTION, SOLUTION INTRAVENOUS CONTINUOUS
Status: DISCONTINUED | OUTPATIENT
Start: 2021-05-04 | End: 2021-05-04 | Stop reason: HOSPADM

## 2021-05-04 RX ORDER — LIDOCAINE HYDROCHLORIDE 20 MG/ML
INJECTION, SOLUTION INFILTRATION; PERINEURAL AS NEEDED
Status: DISCONTINUED | OUTPATIENT
Start: 2021-05-04 | End: 2021-05-04 | Stop reason: SURG

## 2021-05-04 RX ADMIN — PROPOFOL 160 MG: 10 INJECTION, EMULSION INTRAVENOUS at 09:28

## 2021-05-04 RX ADMIN — PHENYLEPHRINE HYDROCHLORIDE 150 MCG: 10 INJECTION INTRAVENOUS at 09:40

## 2021-05-04 RX ADMIN — GLYCOPYRROLATE 0.2 MG: 0.2 INJECTION INTRAMUSCULAR; INTRAVENOUS at 09:28

## 2021-05-04 RX ADMIN — PROPOFOL 160 MCG/KG/MIN: 10 INJECTION, EMULSION INTRAVENOUS at 09:28

## 2021-05-04 RX ADMIN — SODIUM CHLORIDE, POTASSIUM CHLORIDE, SODIUM LACTATE AND CALCIUM CHLORIDE 1000 ML: 600; 310; 30; 20 INJECTION, SOLUTION INTRAVENOUS at 09:11

## 2021-05-04 RX ADMIN — PROPOFOL 40 MG: 10 INJECTION, EMULSION INTRAVENOUS at 09:41

## 2021-05-04 RX ADMIN — LIDOCAINE HYDROCHLORIDE 40 MG: 20 INJECTION, SOLUTION INFILTRATION; PERINEURAL at 09:28

## 2021-05-04 RX ADMIN — PROPOFOL 50 MG: 10 INJECTION, EMULSION INTRAVENOUS at 09:32

## 2021-05-04 RX ADMIN — PHENYLEPHRINE HYDROCHLORIDE 200 MCG: 10 INJECTION INTRAVENOUS at 09:49

## 2021-05-04 NOTE — DISCHARGE INSTRUCTIONS

## 2021-05-04 NOTE — ANESTHESIA POSTPROCEDURE EVALUATION
Patient: Anais West    Procedure Summary     Date: 05/04/21 Room / Location: [facility] ENDOSCOPY 8 /  [facility]    Anesthesia Start: 0921 Anesthesia Stop: 1001    Procedures:       ESOPHAGOGASTRODUODENOSCOPY with bx (N/A Esophagus)      COLONOSCOPY into cecum and terminal ileum with bx's and cold bx polypectomy (N/A ) Diagnosis:       Adenomatous polyp of colon, unspecified part of colon      Change in bowel habits      Gastroesophageal reflux disease without esophagitis      History of acute gastritis      (Adenomatous polyp of colon, unspecified part of colon [D12.6])      (Change in bowel habits [R19.4])      (Gastroesophageal reflux disease without esophagitis [K21.9])      (History of acute gastritis [Z87.19])    Surgeons: Radhika Marcum MD Provider: Luis E Johnson MD    Anesthesia Type: MAC ASA Status: 3          Anesthesia Type: MAC    Vitals  No vitals data found for the desired time range.          Post Anesthesia Care and Evaluation    Patient location during evaluation: bedside  Patient participation: complete - patient participated  Level of consciousness: responsive to light touch, responsive to verbal stimuli and sleepy but conscious  Pain score: 0  Pain management: adequate  Airway patency: patent  Anesthetic complications: No anesthetic complications  PONV Status: none  Cardiovascular status: acceptable and hemodynamically stable  Respiratory status: acceptable  Hydration status: acceptable

## 2021-05-04 NOTE — H&P
[facility]  Pre Procedure History & Physical    Chief Complaint: Personal history of adenomatous polyps, change in bowel habits, GERD      HPI: 70-year-old female presents today for follow-up visit for constipation.  She is a patient of Dr. Marcum.  She was last seen in the office on 8/31/2018.  She is new to me today.  She used to have a history of constipation and use to have to take MiraLAX every day.  She tells me lately everything is seem to have changed and now she is having looser stools.  She does have a bowel movement every day so she is no longer needing to take the MiraLAX.  Her last EGD and colonoscopy was attempted on 5/1/2018.  But unfortunately it had to be stopped due to her having an irregular EKG.  She does have a history of adenomatous colon polyps.  She has done an EGD with dilation in the past and found to have gastritis.  She tells me that since changing her diet she does not take anything for reflux.  She is planning to follow-up with cardiology on 4/13/2021 to make sure that she has their clearance to do these procedures that she wants to do.  She denies any dysphagia, reflux, abdominal pain, nausea and vomiting, constipation, rectal bleeding or melena.  She is appetite is good and her weight is stable.  She denies any significant GI family history at this time.  She does admit now her stools are much looser and she may have some gas but she also takes Metformin and she admits these bowel movements are worse after she drinks coffee in the morning.    Past Medical History:   Past Medical History:   Diagnosis Date   • Abdominal pain    • Anxiety    • Cholelithiasis 1976    surgery   • Depression    • Diabetes mellitus (CMS/HCC)    • QUIÑONES (dyspnea on exertion)    • Excessive daytime sleepiness    • Heat intolerance    • Hiatal hernia    • History of transfusion    • Hyperlipidemia    • Hypertension    • Joint pain    • Malaise and fatigue    • Myalgia    • Nervously  anxious    • Sleep apnea     CPAP   • SOB (shortness of breath)    • Ventricular bigeminy 11/2017       Family History:  Family History   Problem Relation Age of Onset   • Atrial fibrillation Mother    • Heart disease Father    • Heart attack Brother    • Heart disease Brother    • Heart disease Maternal Grandmother    • Heart disease Brother    • Malig Hyperthermia Neg Hx        Social History:   reports that she has never smoked. She has never used smokeless tobacco. She reports that she does not drink alcohol and does not use drugs.    Medications:   No medications prior to admission.       Allergies:  Penicillins and Sulfa antibiotics    ROS:    Pertinent items are noted in HPI     Objective     There were no vitals taken for this visit.    Physical Exam   Constitutional: Pt is oriented to person, place, and time and well-developed, well-nourished, and in no distress.   HENT:   Mouth/Throat: Oropharynx is clear and moist.   Neck: Normal range of motion. Neck supple.   Cardiovascular: Normal rate, regular rhythm and normal heart sounds.    Pulmonary/Chest: Effort normal and breath sounds normal. No respiratory distress. No  wheezes.   Abdominal: Soft. Bowel sounds are normal.   Skin: Skin is warm and dry.   Psychiatric: Mood, memory, affect and judgment normal.     Assessment/Plan     Diagnosis: Personal history of adenomatous polyps, change in bowel habits, GERD      Anticipated Surgical Procedure:  EGD  Colonoscopy    The risks, benefits, and alternatives of this procedure have been discussed with the patient or the responsible party- the patient understands and agrees to proceed.

## 2021-05-04 NOTE — ANESTHESIA PREPROCEDURE EVALUATION
Anesthesia Evaluation     Patient summary reviewed and Nursing notes reviewed   no history of anesthetic complications:  NPO Solid Status: > 8 hours  NPO Liquid Status: > 2 hours           Airway   Mallampati: III  TM distance: >3 FB  Neck ROM: full  Possible difficult intubation  Dental - normal exam     Pulmonary - normal exam   (+) shortness of breath, sleep apnea on CPAP,   (-) COPD, asthma, not a smoker, lung cancer  Cardiovascular - normal exam  Exercise tolerance: good (4-7 METS)    ECG reviewed  Patient on routine beta blocker and Beta blocker given within 24 hours of surgery  Rhythm: regular  Rate: normal    (+) hypertension well controlled 2 medications or greater, dysrhythmias PAC, QUIÑONES, hyperlipidemia,   (-) valvular problems/murmurs, past MI, CAD, angina, CHF, cardiac stents, CABG    ROS comment: Hx/o ventricular bigeminy     Neuro/Psych  (+) psychiatric history Anxiety and Depression,     (-) seizures, TIA, CVA  GI/Hepatic/Renal/Endo    (+) morbid obesity, hiatal hernia, GERD poorly controlled,  liver disease fatty liver disease, renal disease, diabetes mellitus type 2 well controlled,   (-) PUD, hepatitis, GI bleed, no thyroid disorder    Musculoskeletal (-) negative ROS    Abdominal   (+) obese,     Abdomen: soft.   Substance History - negative use     OB/GYN negative ob/gyn ROS         Other - negative ROS                       Anesthesia Plan    ASA 3     MAC     intravenous induction     Anesthetic plan, all risks, benefits, and alternatives have been provided, discussed and informed consent has been obtained with: patient.

## 2021-05-06 LAB
CYTO UR: NORMAL
LAB AP CASE REPORT: NORMAL
PATH REPORT.FINAL DX SPEC: NORMAL
PATH REPORT.GROSS SPEC: NORMAL

## 2021-05-07 NOTE — PROGRESS NOTES
Biopsies from her EGD demonstrate mild inflammation of the stomach body.  She should take famotidine 20 mg up to twice a day as needed for heartburn symptoms    Random colon biopsies show normal tissue.  No evidence of microscopic colitisThe colon polyp was a benign hyperplastic polypHer next colonoscopy should be in 5 years, please place in recall, thank you

## 2021-05-14 ENCOUNTER — TELEPHONE (OUTPATIENT)
Dept: GASTROENTEROLOGY | Facility: CLINIC | Age: 71
End: 2021-05-14

## 2021-05-14 NOTE — TELEPHONE ENCOUNTER
Called pt and advised of Dr Marcum note. Verb understanding.     Pt is asking how long does she need to take the 30 gm of fiber and also why is she taking the fiber.  Pt reports that she is not constipated .  Advised will send message to Dr Marcum.      C/s placed in recall and  for 05/04/2026.

## 2021-05-14 NOTE — TELEPHONE ENCOUNTER
----- Message from Perlita Barcenas sent at 5/14/2021  8:52 AM EDT -----  Regarding: Results/Questions  Contact: [phone]  Pt is wanting to get her results.Also has some questions

## 2021-05-14 NOTE — TELEPHONE ENCOUNTER
Fiber is recommended because she had hemorrhoids on her colonoscopy and fiber is first line therapy to prevent progression of hemorrhoids.  Also important for gut health.  30g are recommended daily

## 2021-05-14 NOTE — TELEPHONE ENCOUNTER
----- Message from Radhika Marcum MD sent at 5/7/2021  5:02 PM EDT -----  Biopsies from her EGD demonstrate mild inflammation of the stomach body.  She should take famotidine 20 mg up to twice a day as needed for heartburn symptoms    Random colon biopsies show normal tissue.  No evidence of microscopic colitisThe colon polyp was a benign hyperplastic polypHer next colonoscopy should be in 5 years, please place in recall, thank you

## 2021-06-23 ENCOUNTER — OFFICE VISIT (OUTPATIENT)
Dept: FAMILY MEDICINE CLINIC | Facility: CLINIC | Age: 71
End: 2021-06-23

## 2021-06-23 ENCOUNTER — LAB (OUTPATIENT)
Dept: FAMILY MEDICINE CLINIC | Facility: CLINIC | Age: 71
End: 2021-06-23

## 2021-06-23 VITALS
SYSTOLIC BLOOD PRESSURE: 157 MMHG | WEIGHT: 251 LBS | HEART RATE: 76 BPM | OXYGEN SATURATION: 97 % | DIASTOLIC BLOOD PRESSURE: 78 MMHG | BODY MASS INDEX: 45.91 KG/M2 | TEMPERATURE: 98.2 F

## 2021-06-23 DIAGNOSIS — M54.50 ACUTE BILATERAL LOW BACK PAIN WITHOUT SCIATICA: Primary | ICD-10-CM

## 2021-06-23 DIAGNOSIS — N64.4 BREAST PAIN IN FEMALE: ICD-10-CM

## 2021-06-23 DIAGNOSIS — M54.50 ACUTE BILATERAL LOW BACK PAIN WITHOUT SCIATICA: ICD-10-CM

## 2021-06-23 DIAGNOSIS — E11.65 TYPE 2 DIABETES MELLITUS WITH HYPERGLYCEMIA, WITHOUT LONG-TERM CURRENT USE OF INSULIN (HCC): ICD-10-CM

## 2021-06-23 LAB
CRP SERPL-MCNC: 0.97 MG/DL (ref 0–0.5)
FERRITIN SERPL-MCNC: 142 NG/ML (ref 13–150)

## 2021-06-23 PROCEDURE — 82728 ASSAY OF FERRITIN: CPT | Performed by: FAMILY MEDICINE

## 2021-06-23 PROCEDURE — 36415 COLL VENOUS BLD VENIPUNCTURE: CPT

## 2021-06-23 PROCEDURE — 86140 C-REACTIVE PROTEIN: CPT | Performed by: FAMILY MEDICINE

## 2021-06-23 PROCEDURE — 99214 OFFICE O/P EST MOD 30 MIN: CPT | Performed by: FAMILY MEDICINE

## 2021-06-23 RX ORDER — HYDROCODONE BITARTRATE AND ACETAMINOPHEN 5; 325 MG/1; MG/1
1 TABLET ORAL EVERY 6 HOURS PRN
Qty: 28 TABLET | Refills: 0 | Status: SHIPPED | OUTPATIENT
Start: 2021-06-23 | End: 2022-06-27

## 2021-06-23 NOTE — PROGRESS NOTES
Chief Complaint  Pain (sore from waist down since her fall earlier in the yr), Breast Pain (needs dx mammo order), and Diabetes (f/u)    Subjective          Anais West presents to [facility]  She fell in January 2021 right before she had COVID.  Since then she has had lower back pain that radiates in to her legs bilaterally.  Trouble walking very far    Pain  This is a new problem. The current episode started more than 1 month ago. The problem occurs constantly. The problem has been unchanged. Associated symptoms include arthralgias and myalgias. Pertinent negatives include no chest pain, fever, rash, sore throat, swollen glands or weakness. The symptoms are aggravated by walking and standing. She has tried rest, position changes and NSAIDs for the symptoms. The treatment provided mild relief.   Breast Pain  This is a new problem. The current episode started 1 to 4 weeks ago. The problem occurs constantly. The problem has been unchanged. Associated symptoms include arthralgias and myalgias. Pertinent negatives include no chest pain, fever, rash, sore throat, swollen glands or weakness.   Diabetes  She presents for her follow-up diabetic visit. She has type 2 diabetes mellitus. There are no hypoglycemic associated symptoms. Pertinent negatives for diabetes include no chest pain and no weakness. There are no hypoglycemic complications. Symptoms are stable.       Objective   Vital Signs:   /78 (BP Location: Right arm, Patient Position: Sitting, Cuff Size: Adult)   Pulse 76   Temp 98.2 °F (36.8 °C) (Infrared)   Wt 114 kg (251 lb)   SpO2 97%   BMI 45.91 kg/m²     Physical Exam  Vitals reviewed.   Constitutional:       General: She is not in acute distress.     Appearance: She is well-developed.   HENT:      Head: Normocephalic.   Eyes:      General: Lids are normal.      Conjunctiva/sclera: Conjunctivae normal.   Neck:      Thyroid: No thyroid mass or thyromegaly.       Trachea: Trachea normal.   Cardiovascular:      Rate and Rhythm: Normal rate and regular rhythm.      Heart sounds: Normal heart sounds.   Pulmonary:      Effort: Pulmonary effort is normal.      Breath sounds: Normal breath sounds.   Musculoskeletal:      Cervical back: Normal range of motion.      Lumbar back: Tenderness present. Decreased range of motion.   Lymphadenopathy:      Cervical: No cervical adenopathy.   Skin:     General: Skin is warm and dry.   Neurological:      Mental Status: She is alert and oriented to person, place, and time.      Gait: Gait abnormal.   Psychiatric:         Attention and Perception: She is attentive.         Mood and Affect: Mood normal.         Speech: Speech normal.         Behavior: Behavior normal.        Result Review :   The following data was reviewed by: Angelia Springer MD on 06/23/2021:  CMP    CMP 1/23/21 1/24/21 1/25/21   Glucose 158 (A) 217 (A) 140 (A)   BUN 56 (A) 52 (A) 39 (A)   Creatinine 2.54 (A) 2.16 (A) 1.08 (A)   eGFR Non African Am 19 (A) 23 (A) 50 (A)   Sodium 136 135 (A) 140   Potassium 3.8 3.9 3.6   Chloride 95 (A) 99 100   Calcium 10.3 9.8 9.7   Albumin 3.70     Total Bilirubin 0.5     Alkaline Phosphatase 80     AST (SGOT) 71 (A)     ALT (SGPT) 89 (A)     (A) Abnormal value            CBC    CBC 1/23/21 1/24/21 1/25/21   WBC 6.60 5.50 6.30   RBC 4.40 4.28 4.42   Hemoglobin 13.3 12.9 13.3   Hematocrit 39.1 37.8 38.7   MCV 88.9 88.4 87.7   MCH 30.1 30.2 30.1   MCHC 33.9 34.1 34.4   RDW 13.6 13.3 13.2   Platelets 274 271 316           Lipid Panel    Lipid Panel 12/7/20   Total Cholesterol 213 (A)   Triglycerides 182 (A)   HDL Cholesterol 56   VLDL Cholesterol 32   LDL Cholesterol  125 (A)   LDL/HDL Ratio 2.15   (A) Abnormal value            TSH    TSH 1/24/21   TSH 0.704           Most Recent A1C    HGBA1C Most Recent 1/24/21   Hemoglobin A1C 7.3 (A)   (A) Abnormal value                      Assessment and Plan    Diagnoses and all orders for this  visit:    1. Acute bilateral low back pain without sciatica (Primary)  -     Ferritin; Future  -     C-reactive protein; Future  -     XR Spine Lumbar 2 or 3 View; Future  -     HYDROcodone-acetaminophen (Norco) 5-325 MG per tablet; Take 1 tablet by mouth Every 6 (Six) Hours As Needed for Moderate Pain  or Severe Pain .  Dispense: 28 tablet; Refill: 0    2. Breast pain in female  -     Mammo Diagnostic Bilateral With CAD; Future  -     US Breast Bilateral Limited; Future    3. Type 2 diabetes mellitus with hyperglycemia, without long-term current use of insulin (CMS/[facility])        Follow Up   No follow-ups on file.  Patient was given instructions and counseling regarding her condition or for health maintenance advice. Please see specific information pulled into the AVS if appropriate.

## 2021-07-02 DIAGNOSIS — M54.50 ACUTE BILATERAL LOW BACK PAIN WITHOUT SCIATICA: Primary | ICD-10-CM

## 2021-07-02 DIAGNOSIS — N64.4 BREAST PAIN IN FEMALE: ICD-10-CM

## 2021-07-02 DIAGNOSIS — M54.50 ACUTE BILATERAL LOW BACK PAIN WITHOUT SCIATICA: ICD-10-CM

## 2021-07-08 ENCOUNTER — TREATMENT (OUTPATIENT)
Dept: PHYSICAL THERAPY | Facility: CLINIC | Age: 71
End: 2021-07-08

## 2021-07-08 DIAGNOSIS — M54.50 ACUTE BILATERAL LOW BACK PAIN WITHOUT SCIATICA: Primary | ICD-10-CM

## 2021-07-08 PROCEDURE — 97110 THERAPEUTIC EXERCISES: CPT | Performed by: PHYSICAL THERAPIST

## 2021-07-08 PROCEDURE — 97162 PT EVAL MOD COMPLEX 30 MIN: CPT | Performed by: PHYSICAL THERAPIST

## 2021-07-08 NOTE — PROGRESS NOTES
Physical Therapy Initial Evaluation and Plan of Care    Patient: Anais West   : 1950  Diagnosis/ICD-10 Code:  Acute bilateral low back pain without sciatica [M54.5]  Referring practitioner: Angelia Springer MD  Date of Initial Visit: 2021  Today's Date: 2021  Patient seen for 1 session           Subjective Questionnaire: Oswestry:     Subjective Evaluation    History of Present Illness  Mechanism of injury: Patient presents to physical therapy with orders to address acute low back pain. Patient states that her legs hurt really bad at night and her balance is off.  She states that she feels like she is falling forward.  After static sitting, she has to pause before she begins walking to wait for the pain to lessen.  She states that the pain is in her anterior thighs down to her shins.  She states that she had x-rays and identified 'arthritis' in her lower back.  She states that she has a difficult time getting to sleep at night and it sometimes wakes her from her sleep. She did have a fall in January and that intensified her pain.      Patient Occupation: Retired:  at insurance office Pain  Current pain ratin  At best pain ratin  At worst pain ratin  Location: bilateral legs: thighs, knees, shins  Relieving factors: rest and medications (Motrin)  Aggravating factors: sleeping, prolonged positioning and ambulation (laying in bed)    Social Support  Lives in: one-story house  Lives with: spouse    Diagnostic Tests  X-ray: abnormal    Treatments  Previous treatment: medication  Current treatment: physical therapy  Patient Goals  Patient goals for therapy: decreased pain, improved balance, increased motion and increased strength           Objective        Special Questions  Patient is experiencing night pain and disturbed sleep.       Static Posture   General Observations  Scoliosis.     Lumbar Spine   Increased lordosis.     Palpation   Left   Hypertonic in the lumbar  paraspinals and quadratus lumborum.   Tenderness of the lumbar paraspinals.     Right   Hypertonic in the lumbar paraspinals and quadratus lumborum. Tenderness of the lumbar paraspinals.     Tenderness     Left Hip   Tenderness in the PSIS.     Right Hip   Tenderness in the PSIS.     Neurological Testing     Sensation     Lumbar   Left   Intact: light touch    Right   Intact: light touch    Active Range of Motion     Lumbar   Flexion: 70 degrees   Extension: 10 degrees with pain  Left lateral flexion: 28 degrees   Right lateral flexion: 15 degrees     Passive Range of Motion   Left Hip   Flexion: 90 degrees   Extension: 0 degrees   External rotation (90/90): 25 degrees   Internal rotation (90/90): 0 degrees     Right Hip   Flexion: 90 degrees   Extension: 0 degrees   External rotation (90/90): 25 degrees   Internal rotation (90/90): 10 degrees     Strength/Myotome Testing     Left Hip   Planes of Motion   Flexion: 4  Extension: 3-  Abduction: 3+    Right Hip   Planes of Motion   Flexion: 4  Extension: 3-  Abduction: 3+    Left Knee   Flexion: 4+  Extension: 4+    Right Knee   Flexion: 4+  Extension: 4+    Left Ankle/Foot   Dorsiflexion: 5    Right Ankle/Foot   Dorsiflexion: 5    Muscle Activation   Patient unable to activate left transverse abdominals and right transverse abdominals.     Ambulation   Weight-Bearing Status   Weight-Bearing Status (Left): weight-bearing as tolerated   Weight-Bearing Status (Right): weight-bearing as tolerated    Assistive device used: none    Observational Gait   Gait: antalgic   Decreased walking speed and stride length.         Assessment & Plan     Assessment  Impairments: abnormal gait, abnormal or restricted ROM, activity intolerance, impaired balance, impaired physical strength, lacks appropriate home exercise program and pain with function  Assessment details: The patient is a 70 y.o. female who presents to physical therapy today for back pain. Upon initial evaluation, the  patient demonstrates the impairments listed above. Due to these impairments, the patient is unable to sleep due to pain. The patient would benefit from skilled PT services to address functional limitations and impairments and to improve patient quality of life.    Prognosis: good  Functional Limitations: sleeping, walking, uncomfortable because of pain, moving in bed, sitting, standing and unable to perform repetitive tasks  Goals  Plan Goals: STG's: 3 weeks  Patient will report a decrease in pain by 25%   Patient will be able to pick a light (<3#) object off the floor without an increase in pain  Patient will be able to perform HEP with minimal verbal cues    LTG's: By discharge  Patient will report a decrease in pain by 65%  Patient will report an elimination of radicular symptoms  Patient will demonstrate an improvement in overall function as measured by an improvement in the Oswestry Disability Index score by >/= 7 points   Patient will be able to sleep > 5 hours without waking from pain  Patient will be able to tolerate standing for > 25 minutes without increased pain  Patient will be able to tolerate sitting for > 60 minutes without increased pain  Patient will be able to ambulate community distances without increased pain  Patient will be independent with final HEP     Plan  Therapy options: will be seen for skilled physical therapy services  Planned modality interventions: cryotherapy, electrical stimulation/Russian stimulation, TENS, thermotherapy (hydrocollator packs), traction, dry needling and ultrasound  Planned therapy interventions: abdominal trunk stabilization, balance/weight-bearing training, body mechanics training, flexibility, functional ROM exercises, gait training, home exercise program, manual therapy, neuromuscular re-education, postural training, soft tissue mobilization, spinal/joint mobilization, strengthening, stretching and therapeutic activities  Duration in visits: 12  Treatment plan  discussed with: patient        History # of Personal Factors and/or Comorbidities: MODERATE (1-2)  Examination of Body System(s): # of elements: MODERATE (3)  Clinical Presentation: EVOLVING  Clinical Decision Making: MODERATE      Timed:         Manual Therapy:    15     mins  55443;     Therapeutic Exercise:         mins  52675;     Neuromuscular Scott:        mins  68602;    Therapeutic Activity:          mins  00491;     Gait Training:           mins  38112;     Ultrasound:          mins  27583;    Ionto                                   mins   93908  Self Care                            mins   85339  Canalith Repos         mins 45511      Un-Timed:  Electrical Stimulation:         mins  85573 ( );  Dry Needling          mins self-pay  Traction          mins 71056  Low Eval          Mins  42109  Mod Eval     25     Mins  98737  High Eval                            Mins  74584  Re-Eval                               mins  20855        Timed Treatment:   15   mins   Total Treatment:     40   mins    PT SIGNATURE: Kimberly Ceballos PT   DATE TREATMENT INITIATED: 7/8/2021    Initial Certification  Certification Period: 10/6/2021  I certify that the therapy services are furnished while this patient is under my care.  The services outlined above are required by this patient, and will be reviewed every 90 days.     PHYSICIAN: Angelia Springer MD      DATE:     Please sign and return via fax to [phone]. Thank you, [facility].

## 2021-07-13 ENCOUNTER — TELEPHONE (OUTPATIENT)
Dept: PHYSICAL THERAPY | Facility: CLINIC | Age: 71
End: 2021-07-13

## 2021-07-15 ENCOUNTER — TELEPHONE (OUTPATIENT)
Dept: PHYSICAL THERAPY | Facility: CLINIC | Age: 71
End: 2021-07-15

## 2021-07-20 ENCOUNTER — DOCUMENTATION (OUTPATIENT)
Dept: PHYSICAL THERAPY | Facility: CLINIC | Age: 71
End: 2021-07-20

## 2021-07-20 NOTE — PROGRESS NOTES
Discharge Summary  Discharge Summary from Physical Therapy Report      Dates  PT visit: 7/8/21  Number of Visits: 1     Discharge Status of Patient: See MD Note dated 7/8/21    Goals: Not Met    Discharge Plan: Patient to return to referring/providing physician    Comments Patient called after her initial evaluation and cancelled all of her appointments stating therapy was not helping even though she had only attended her initial evaluation.  Recommend MD follow-up.     Date of Discharge 7/20/21        Kimberly Ceballos, PT  Physical Therapist

## 2021-08-13 RX ORDER — LOSARTAN POTASSIUM 100 MG/1
100 TABLET ORAL DAILY
Qty: 30 TABLET | Refills: 2 | Status: SHIPPED | OUTPATIENT
Start: 2021-08-13 | End: 2021-10-11 | Stop reason: SDUPTHER

## 2021-08-13 NOTE — TELEPHONE ENCOUNTER
Caller: Anais West    Relationship: Self    Best call back number:[phone]     Medication needed:   Requested Prescriptions     Pending Prescriptions Disp Refills   • SITagliptin (Januvia) 50 MG tablet 30 tablet 4     Sig: Take 1 tablet by mouth Daily.   • metoprolol tartrate (LOPRESSOR) 25 MG tablet 60 tablet 5     Sig: Take 1 tablet by mouth 2 (Two) Times a Day.   • metFORMIN (GLUCOPHAGE) 1000 MG tablet 60 tablet 7   • losartan (COZAAR) 100 MG tablet 30 tablet 5       When do you need the refill by: ASAP     Does the patient have less than a 3 day supply:  [x] Yes  [] No    What is the patient's preferred pharmacy: [id number] [place] [address] [address] AT [address] [phone] [facility] [phone]

## 2021-08-24 RX ORDER — SITAGLIPTIN 50 MG/1
TABLET, FILM COATED ORAL
Qty: 30 TABLET | Refills: 2 | Status: SHIPPED | OUTPATIENT
Start: 2021-08-24 | End: 2021-10-11 | Stop reason: SDUPTHER

## 2021-09-13 ENCOUNTER — PATIENT MESSAGE (OUTPATIENT)
Dept: FAMILY MEDICINE CLINIC | Facility: CLINIC | Age: 71
End: 2021-09-13

## 2021-09-13 DIAGNOSIS — M54.50 ACUTE BILATERAL LOW BACK PAIN WITHOUT SCIATICA: Primary | ICD-10-CM

## 2021-10-07 ENCOUNTER — PATIENT MESSAGE (OUTPATIENT)
Dept: FAMILY MEDICINE CLINIC | Facility: CLINIC | Age: 71
End: 2021-10-07

## 2021-10-07 DIAGNOSIS — R42 DIZZINESS: Primary | ICD-10-CM

## 2021-10-11 ENCOUNTER — TELEPHONE (OUTPATIENT)
Dept: FAMILY MEDICINE CLINIC | Facility: CLINIC | Age: 71
End: 2021-10-11

## 2021-10-11 RX ORDER — LOSARTAN POTASSIUM 100 MG/1
100 TABLET ORAL DAILY
Qty: 30 TABLET | Refills: 2 | Status: SHIPPED | OUTPATIENT
Start: 2021-10-11 | End: 2022-01-09

## 2021-10-11 NOTE — TELEPHONE ENCOUNTER
Relationship: Pharmacy      Medication requested (name and dosage): metoprolol tartrate (LOPRESSOR) 25 MG tablet  metFORMIN (GLUCOPHAGE) 1000 MG tablet  Januvia 50 MG tablet  losartan (COZAAR) 100 MG tablet  Pharmacy where request should be sent:[facility] [address] [place], [place] [phone] [place] [phone]       Best call back number: [phone]  Does the patient have less than a 3 day supply:  [] Yes  [] No    Perlita Sheridan Rep   10/11/21 12:30 EDT

## 2021-11-30 ENCOUNTER — TELEPHONE (OUTPATIENT)
Dept: FAMILY MEDICINE CLINIC | Facility: CLINIC | Age: 71
End: 2021-11-30

## 2021-12-01 ENCOUNTER — LAB (OUTPATIENT)
Dept: FAMILY MEDICINE CLINIC | Facility: CLINIC | Age: 71
End: 2021-12-01

## 2021-12-01 DIAGNOSIS — E11.65 TYPE 2 DIABETES MELLITUS WITH HYPERGLYCEMIA, WITHOUT LONG-TERM CURRENT USE OF INSULIN (HCC): Primary | ICD-10-CM

## 2021-12-01 LAB
ALBUMIN SERPL-MCNC: 4 G/DL (ref 3.5–5.2)
ALBUMIN UR-MCNC: <1.2 MG/DL
ALBUMIN/GLOB SERPL: 1.3 G/DL
ALP SERPL-CCNC: 81 U/L (ref 39–117)
ALT SERPL W P-5'-P-CCNC: 34 U/L (ref 1–33)
ANION GAP SERPL CALCULATED.3IONS-SCNC: 7.5 MMOL/L (ref 5–15)
AST SERPL-CCNC: 29 U/L (ref 1–32)
BILIRUB SERPL-MCNC: 0.5 MG/DL (ref 0–1.2)
BUN SERPL-MCNC: 15 MG/DL (ref 8–23)
BUN/CREAT SERPL: 17.9 (ref 7–25)
CALCIUM SPEC-SCNC: 9.9 MG/DL (ref 8.6–10.5)
CHLORIDE SERPL-SCNC: 99 MMOL/L (ref 98–107)
CHOLEST SERPL-MCNC: 236 MG/DL (ref 0–200)
CO2 SERPL-SCNC: 29.5 MMOL/L (ref 22–29)
CREAT SERPL-MCNC: 0.84 MG/DL (ref 0.57–1)
CREAT UR-MCNC: 124.7 MG/DL
GFR SERPL CREATININE-BSD FRML MDRD: 67 ML/MIN/1.73
GLOBULIN UR ELPH-MCNC: 3.1 GM/DL
GLUCOSE SERPL-MCNC: 146 MG/DL (ref 65–99)
HBA1C MFR BLD: 6.5 % (ref 3.5–5.6)
HDLC SERPL-MCNC: 46 MG/DL (ref 40–60)
LDLC SERPL CALC-MCNC: 153 MG/DL (ref 0–100)
LDLC/HDLC SERPL: 3.26 {RATIO}
MICROALBUMIN/CREAT UR: NORMAL MG/G{CREAT}
POTASSIUM SERPL-SCNC: 4.3 MMOL/L (ref 3.5–5.2)
PROT SERPL-MCNC: 7.1 G/DL (ref 6–8.5)
SODIUM SERPL-SCNC: 136 MMOL/L (ref 136–145)
TRIGL SERPL-MCNC: 200 MG/DL (ref 0–150)
VLDLC SERPL-MCNC: 37 MG/DL (ref 5–40)

## 2021-12-01 PROCEDURE — 80061 LIPID PANEL: CPT | Performed by: FAMILY MEDICINE

## 2021-12-01 PROCEDURE — 82570 ASSAY OF URINE CREATININE: CPT | Performed by: FAMILY MEDICINE

## 2021-12-01 PROCEDURE — 83036 HEMOGLOBIN GLYCOSYLATED A1C: CPT | Performed by: FAMILY MEDICINE

## 2021-12-01 PROCEDURE — 82043 UR ALBUMIN QUANTITATIVE: CPT | Performed by: FAMILY MEDICINE

## 2021-12-01 PROCEDURE — 36415 COLL VENOUS BLD VENIPUNCTURE: CPT | Performed by: FAMILY MEDICINE

## 2021-12-01 PROCEDURE — 80053 COMPREHEN METABOLIC PANEL: CPT | Performed by: FAMILY MEDICINE

## 2021-12-08 ENCOUNTER — OFFICE VISIT (OUTPATIENT)
Dept: FAMILY MEDICINE CLINIC | Facility: CLINIC | Age: 71
End: 2021-12-08

## 2021-12-08 ENCOUNTER — TELEPHONE (OUTPATIENT)
Dept: FAMILY MEDICINE CLINIC | Facility: CLINIC | Age: 71
End: 2021-12-08

## 2021-12-08 VITALS
HEART RATE: 80 BPM | BODY MASS INDEX: 48.52 KG/M2 | HEIGHT: 61 IN | OXYGEN SATURATION: 98 % | TEMPERATURE: 97.1 F | DIASTOLIC BLOOD PRESSURE: 83 MMHG | SYSTOLIC BLOOD PRESSURE: 145 MMHG | WEIGHT: 257 LBS

## 2021-12-08 DIAGNOSIS — R42 DIZZINESS: ICD-10-CM

## 2021-12-08 DIAGNOSIS — E55.9 VITAMIN D DEFICIENCY: ICD-10-CM

## 2021-12-08 DIAGNOSIS — Z00.00 MEDICARE ANNUAL WELLNESS VISIT, SUBSEQUENT: Primary | ICD-10-CM

## 2021-12-08 DIAGNOSIS — Z78.0 POSTMENOPAUSAL: ICD-10-CM

## 2021-12-08 PROCEDURE — 99397 PER PM REEVAL EST PAT 65+ YR: CPT | Performed by: FAMILY MEDICINE

## 2021-12-08 RX ORDER — SULINDAC 150 MG/1
TABLET ORAL
COMMUNITY
Start: 2021-12-01 | End: 2022-06-27

## 2021-12-08 RX ORDER — CARIPRAZINE 1.5 MG/1
CAPSULE, GELATIN COATED ORAL
COMMUNITY
Start: 2021-12-02

## 2021-12-08 NOTE — TELEPHONE ENCOUNTER
PATIENT CALLED WITH CONTACT TO SEND REFERRAL TO ENDOCRINOLOGY.    PLEASE FAX TO [facility] FAX#[phone]

## 2021-12-08 NOTE — PROGRESS NOTES
The ABCs of the Annual Wellness Visit  Subsequent Medicare Wellness Visit    Chief Complaint   Patient presents with   • Medicare Wellness-subsequent     had blood work last week       Subjective    History of Present Illness:  Anais West is a 71 y.o. female who presents for a Subsequent Medicare Wellness Visit.    The following portions of the patient's history were reviewed and   updated as appropriate: allergies, current medications, past family history, past medical history, past social history, past surgical history and problem list.    Compared to one year ago, the patient feels her physical   health is the same.    Compared to one year ago, the patient feels her mental   health is the same.    Recent Hospitalizations:  This patient has had a [facility] admission record on file within the last 365 days.    Current Medical Providers:  Patient Care Team:  Angelia Springer MD as PCP - General (Family Medicine)  Lamont Brar MD as Consulting Physician (Orthopedic Surgery)    Outpatient Medications Prior to Visit   Medication Sig Dispense Refill   • sulindac (CLINORIL) 150 MG tablet      • Vraylar 1.5 MG capsule capsule      • Blood Glucose Monitoring Suppl (ACCU-CHEK MEAGAN PLUS) w/Device kit ACCU-CHEK MEAGAN PLUS w/Device KIT     • buPROPion (WELLBUTRIN) 75 MG tablet Take 75 mg by mouth Every Morning.     • glucose blood (ACCU-CHEK MEAGAN) test strip ACCU-CHEK MEAGAN IN VITRO STRIP     • HYDROcodone-acetaminophen (Norco) 5-325 MG per tablet Take 1 tablet by mouth Every 6 (Six) Hours As Needed for Moderate Pain  or Severe Pain . 28 tablet 0   • lamoTRIgine (LaMICtal) 200 MG tablet TAKE ONE (1) TABLET BY MOUTH EVERY MORNING     • losartan (COZAAR) 100 MG tablet Take 1 tablet by mouth Daily. 30 tablet 2   • metFORMIN (GLUCOPHAGE) 1000 MG tablet Take 1 tablet by mouth 2 (Two) Times a Day With Meals. 60 tablet 2   • metoprolol tartrate (LOPRESSOR) 25 MG tablet Take 1 tablet by mouth 2 (Two) Times a Day. 60  tablet 2   • NON FORMULARY APPLY ONE (1) MILLILITER (ML) TO inner thigh AND RUB IN WELL IN THE MORNING     • NON FORMULARY APPLY ONE (1) MILLILITER (ML) (FOUR (4) CLICKS) TO THE SKIN OF THE inner thigh EVERY MORNING.     • QUEtiapine (SEROquel) 25 MG tablet TABLET ONE-HALF (1/2) TO ONE (1) TABLET BY MOUTH EVERY NIGHT AT BEDTIME     • SITagliptin (Januvia) 50 MG tablet Take 1 tablet by mouth Daily. 30 tablet 2     No facility-administered medications prior to visit.       Opioid medication/s are on active medication list.  and I have evaluated her active treatment plan and pain score trends (see table).  There were no vitals filed for this visit.  I have reviewed the chart for potential of high risk medication and harmful drug interactions in the elderly.            Aspirin is not on active medication list.  Aspirin use is contraindicated for this patient due to: history of bleeding.  .    Patient Active Problem List   Diagnosis   • Epigastric pain   • Pharyngoesophageal dysphagia   • Polyp of transverse colon   • Hypertension   • Constipation   • Xiphoid pain   • Depression   • Anxiety   • Hyperlipidemia   • Type 2 diabetes mellitus (HCC)   • Steatosis of liver   • Wellness examination   • Encounter for hepatitis C screening test for low risk patient   • Bronchitis   • Fever   • Fall at home   • Acute renal failure (HCC)   • COVID-19 virus infection   • Adenomatous polyp of colon   • Change in bowel habits   • Gastroesophageal reflux disease without esophagitis   • History of acute gastritis   • Breast pain in female   • Acute bilateral low back pain without sciatica   • Medicare annual wellness visit, subsequent   • Postmenopausal   • Vitamin D deficiency     Advance Care Planning  Advance Directive is on file.  ACP discussion was held with the patient during this visit. Patient has an advance directive in EMR which is still valid.           Objective    Vitals:    12/08/21 1107   BP: 145/83   BP Location: Left arm  "  Patient Position: Sitting   Cuff Size: Adult   Pulse: 80   Temp: 97.1 °F (36.2 °C)   TempSrc: Infrared   SpO2: 98%   Weight: 117 kg (257 lb)   Height: 154.9 cm (61\")     BMI Readings from Last 1 Encounters:   12/08/21 48.56 kg/m²   BMI is above normal parameters. Recommendations include: exercise counseling    Does the patient have evidence of cognitive impairment? No    Physical Exam  Vitals and nursing note reviewed.   Constitutional:       General: She is not in acute distress.     Appearance: She is well-developed.   HENT:      Head: Normocephalic.   Eyes:      General: Lids are normal.      Conjunctiva/sclera: Conjunctivae normal.   Neck:      Thyroid: No thyroid mass or thyromegaly.      Trachea: Trachea normal.   Cardiovascular:      Rate and Rhythm: Normal rate and regular rhythm.      Heart sounds: Normal heart sounds.   Pulmonary:      Effort: Pulmonary effort is normal.      Breath sounds: Normal breath sounds.   Musculoskeletal:      Cervical back: Normal range of motion.   Lymphadenopathy:      Cervical: No cervical adenopathy.   Skin:     General: Skin is warm and dry.   Neurological:      Mental Status: She is alert and oriented to person, place, and time.   Psychiatric:         Attention and Perception: She is attentive.         Mood and Affect: Mood normal.         Speech: Speech normal.         Behavior: Behavior normal.       Lab Results   Component Value Date    TRIG 200 (H) 12/01/2021    HDL 46 12/01/2021     (H) 12/01/2021    VLDL 37 12/01/2021    HGBA1C 6.5 (H) 12/01/2021            HEALTH RISK ASSESSMENT    Smoking Status:  Social History     Tobacco Use   Smoking Status Never Smoker   Smokeless Tobacco Never Used     Alcohol Consumption:  Social History     Substance and Sexual Activity   Alcohol Use Never    Comment: Daily caffeine use     Fall Risk Screen:    [facility] Fall Risk Assessment has not been completed.    Depression Screening:  PHQ-2/PHQ-9 Depression Screening 12/8/2021 "   Little interest or pleasure in doing things 0   Feeling down, depressed, or hopeless 0   Total Score 0       Health Habits and Functional and Cognitive Screening:  Functional & Cognitive Status 12/8/2021   Do you have difficulty preparing food and eating? No   Do you have difficulty bathing yourself, getting dressed or grooming yourself? No   Do you have difficulty using the toilet? No   Do you have difficulty moving around from place to place? No   Do you have trouble with steps or getting out of a bed or a chair? No   Current Diet Well Balanced Diet   Dental Exam Up to date   Eye Exam Up to date   Exercise (times per week) 0 times per week   Current Exercises Include -   Do you need help using the phone?  No   Are you deaf or do you have serious difficulty hearing?  No   Do you need help with transportation? No   Do you need help shopping? No   Do you need help preparing meals?  No   Do you need help with housework?  No   Do you need help with laundry? No   Do you need help taking your medications? No   Do you ever drive or ride in a car without wearing a seat belt? No   Have you felt unusual stress, anger or loneliness in the last month? No   Who do you live with? Spouse   If you need help, do you have trouble finding someone available to you? No   Do you have difficulty concentrating, remembering or making decisions? No       Age-appropriate Screening Schedule:  Refer to the list below for future screening recommendations based on patient's age, sex and/or medical conditions. Orders for these recommended tests are listed in the plan section. The patient has been provided with a written plan.    Health Maintenance   Topic Date Due   • DXA SCAN  Never done   • TDAP/TD VACCINES (1 - Tdap) Never done   • ZOSTER VACCINE (1 of 2) Never done   • DIABETIC FOOT EXAM  11/26/2020   • DIABETIC EYE EXAM  10/21/2021   • HEMOGLOBIN A1C  06/01/2022   • LIPID PANEL  12/01/2022   • URINE MICROALBUMIN  12/01/2022   • MAMMOGRAM   07/01/2023   • INFLUENZA VACCINE  Completed              Assessment/Plan   CMS Preventative Services Quick Reference  Risk Factors Identified During Encounter  None Identified  The above risks/problems have been discussed with the patient.  Follow up actions/plans if indicated are seen below in the Assessment/Plan Section.  Pertinent information has been shared with the patient in the After Visit Summary.    Diagnoses and all orders for this visit:    1. Medicare annual wellness visit, subsequent (Primary)    2. Postmenopausal  -     Ambulatory Referral to Endocrinology  -     DEXA Bone Density Axial    3. Vitamin D deficiency  -     Ambulatory Referral to Endocrinology        Follow Up:   Return in about 6 months (around 6/8/2022).     An After Visit Summary and PPPS were made available to the patient.

## 2022-01-09 RX ORDER — LOSARTAN POTASSIUM 100 MG/1
TABLET ORAL
Qty: 30 TABLET | Refills: 2 | Status: SHIPPED | OUTPATIENT
Start: 2022-01-09 | End: 2022-05-07

## 2022-01-27 RX ORDER — SITAGLIPTIN 50 MG/1
TABLET, FILM COATED ORAL
Qty: 30 TABLET | Refills: 2 | Status: SHIPPED | OUTPATIENT
Start: 2022-01-27 | End: 2022-05-06

## 2022-05-06 RX ORDER — SITAGLIPTIN 50 MG/1
TABLET, FILM COATED ORAL
Qty: 30 TABLET | Refills: 5 | Status: SHIPPED | OUTPATIENT
Start: 2022-05-06 | End: 2022-07-17 | Stop reason: SDUPTHER

## 2022-05-07 RX ORDER — LOSARTAN POTASSIUM 100 MG/1
TABLET ORAL
Qty: 30 TABLET | Refills: 0 | Status: SHIPPED | OUTPATIENT
Start: 2022-05-07 | End: 2022-06-12

## 2022-05-25 ENCOUNTER — TELEPHONE (OUTPATIENT)
Dept: FAMILY MEDICINE CLINIC | Facility: CLINIC | Age: 72
End: 2022-05-25

## 2022-05-25 DIAGNOSIS — E11.65 TYPE 2 DIABETES MELLITUS WITH HYPERGLYCEMIA, WITHOUT LONG-TERM CURRENT USE OF INSULIN: Primary | ICD-10-CM

## 2022-05-25 NOTE — TELEPHONE ENCOUNTER
PT WOULD LIKE TO GO TO THE [facility] TO HAVE HER LABS DRAWN BEFORE HER APPT WITH YOU ON 6/8. COULD YOU PLEASE PUT THESE ORDERS IN HER CHART?     THANK YOU

## 2022-06-01 ENCOUNTER — LAB (OUTPATIENT)
Dept: LAB | Facility: HOSPITAL | Age: 72
End: 2022-06-01

## 2022-06-01 LAB
ALBUMIN SERPL-MCNC: 4 G/DL (ref 3.5–5.2)
ALBUMIN/GLOB SERPL: 1.7 G/DL
ALP SERPL-CCNC: 85 U/L (ref 39–117)
ALT SERPL W P-5'-P-CCNC: 31 U/L (ref 1–33)
ANION GAP SERPL CALCULATED.3IONS-SCNC: 8.1 MMOL/L (ref 5–15)
AST SERPL-CCNC: 23 U/L (ref 1–32)
BILIRUB SERPL-MCNC: 0.3 MG/DL (ref 0–1.2)
BUN SERPL-MCNC: 18 MG/DL (ref 8–23)
BUN/CREAT SERPL: 25 (ref 7–25)
CALCIUM SPEC-SCNC: 9.1 MG/DL (ref 8.6–10.5)
CHLORIDE SERPL-SCNC: 101 MMOL/L (ref 98–107)
CHOLEST SERPL-MCNC: 221 MG/DL (ref 0–200)
CO2 SERPL-SCNC: 27.9 MMOL/L (ref 22–29)
CREAT SERPL-MCNC: 0.72 MG/DL (ref 0.57–1)
EGFRCR SERPLBLD CKD-EPI 2021: 90.6 ML/MIN/1.73
GLOBULIN UR ELPH-MCNC: 2.3 GM/DL
GLUCOSE SERPL-MCNC: 128 MG/DL (ref 65–99)
HBA1C MFR BLD: 6.5 % (ref 3.5–5.6)
HDLC SERPL-MCNC: 52 MG/DL (ref 40–60)
LDLC SERPL CALC-MCNC: 139 MG/DL (ref 0–100)
LDLC/HDLC SERPL: 2.6 {RATIO}
POTASSIUM SERPL-SCNC: 4.1 MMOL/L (ref 3.5–5.2)
PROT SERPL-MCNC: 6.3 G/DL (ref 6–8.5)
SODIUM SERPL-SCNC: 137 MMOL/L (ref 136–145)
TRIGL SERPL-MCNC: 168 MG/DL (ref 0–150)
VLDLC SERPL-MCNC: 30 MG/DL (ref 5–40)

## 2022-06-01 PROCEDURE — 36415 COLL VENOUS BLD VENIPUNCTURE: CPT | Performed by: FAMILY MEDICINE

## 2022-06-01 PROCEDURE — 83036 HEMOGLOBIN GLYCOSYLATED A1C: CPT | Performed by: FAMILY MEDICINE

## 2022-06-01 PROCEDURE — 80061 LIPID PANEL: CPT | Performed by: FAMILY MEDICINE

## 2022-06-01 PROCEDURE — 80053 COMPREHEN METABOLIC PANEL: CPT | Performed by: FAMILY MEDICINE

## 2022-06-12 RX ORDER — LOSARTAN POTASSIUM 100 MG/1
TABLET ORAL
Qty: 30 TABLET | Refills: 5 | Status: SHIPPED | OUTPATIENT
Start: 2022-06-12

## 2022-06-27 ENCOUNTER — OFFICE VISIT (OUTPATIENT)
Dept: FAMILY MEDICINE CLINIC | Facility: CLINIC | Age: 72
End: 2022-06-27

## 2022-06-27 VITALS
WEIGHT: 253 LBS | HEART RATE: 77 BPM | BODY MASS INDEX: 47.8 KG/M2 | TEMPERATURE: 97.6 F | OXYGEN SATURATION: 96 % | SYSTOLIC BLOOD PRESSURE: 133 MMHG | DIASTOLIC BLOOD PRESSURE: 71 MMHG

## 2022-06-27 DIAGNOSIS — E11.65 TYPE 2 DIABETES MELLITUS WITH HYPERGLYCEMIA, WITHOUT LONG-TERM CURRENT USE OF INSULIN: ICD-10-CM

## 2022-06-27 DIAGNOSIS — E78.5 HYPERLIPIDEMIA, UNSPECIFIED HYPERLIPIDEMIA TYPE: ICD-10-CM

## 2022-06-27 DIAGNOSIS — Z12.31 ENCOUNTER FOR SCREENING MAMMOGRAM FOR BREAST CANCER: ICD-10-CM

## 2022-06-27 DIAGNOSIS — I10 PRIMARY HYPERTENSION: Primary | ICD-10-CM

## 2022-06-27 DIAGNOSIS — Z23 NEED FOR VACCINATION: ICD-10-CM

## 2022-06-27 PROCEDURE — 90750 HZV VACC RECOMBINANT IM: CPT | Performed by: FAMILY MEDICINE

## 2022-06-27 PROCEDURE — 99214 OFFICE O/P EST MOD 30 MIN: CPT | Performed by: FAMILY MEDICINE

## 2022-07-17 NOTE — ASSESSMENT & PLAN NOTE
Diabetes is improving with treatment.   Reminded to bring in blood sugar diary at next visit.  Dietary recommendations for ADA diet.  Regular aerobic exercise.  Reminded to get yearly retinal exam.  Diabetes will be reassessed in 6 months.

## 2022-09-02 ENCOUNTER — CLINICAL SUPPORT (OUTPATIENT)
Dept: FAMILY MEDICINE CLINIC | Facility: CLINIC | Age: 72
End: 2022-09-02

## 2022-09-02 DIAGNOSIS — Z23 NEED FOR VACCINATION: Primary | ICD-10-CM

## 2022-09-02 PROCEDURE — 90750 HZV VACC RECOMBINANT IM: CPT | Performed by: FAMILY MEDICINE

## 2022-09-26 ENCOUNTER — TELEPHONE (OUTPATIENT)
Dept: FAMILY MEDICINE CLINIC | Facility: CLINIC | Age: 72
End: 2022-09-26

## 2022-09-26 NOTE — TELEPHONE ENCOUNTER
FYI    CHANGE IN PHARMACY TO [facility] IN [facility] [id number] - [place], [place] - [address] AT [place] [address] - [phone]  - [phone]   [phone]

## 2022-11-01 RX ORDER — SITAGLIPTIN 50 MG/1
TABLET, FILM COATED ORAL
Qty: 30 TABLET | Refills: 5 | Status: SHIPPED | OUTPATIENT
Start: 2022-11-01

## 2022-11-03 ENCOUNTER — TELEPHONE (OUTPATIENT)
Dept: FAMILY MEDICINE CLINIC | Facility: CLINIC | Age: 72
End: 2022-11-03

## 2022-11-03 NOTE — TELEPHONE ENCOUNTER
Caller: Anais West    Relationship: Self    What was the call regarding: PATIENT IS LETTING DR GUILLEN KNOW SHE HAS CHANGED PRIMARY CARE PROVIDERS AND WILL NOT NEED ANY MORE MEDICATION REFILLS.

## 2022-12-07 ENCOUNTER — TRANSCRIBE ORDERS (OUTPATIENT)
Dept: ADMINISTRATIVE | Facility: HOSPITAL | Age: 72
End: 2022-12-07

## 2022-12-07 DIAGNOSIS — I99.8 ISCHEMIA: ICD-10-CM

## 2022-12-07 DIAGNOSIS — R94.31 ABNORMAL EKG: Primary | ICD-10-CM

## 2022-12-14 ENCOUNTER — HOSPITAL ENCOUNTER (OUTPATIENT)
Dept: CARDIOLOGY | Facility: HOSPITAL | Age: 72
Discharge: HOME | End: 2022-12-14
Admitting: HOSPITALIST

## 2022-12-14 DIAGNOSIS — I99.8 ISCHEMIA: ICD-10-CM

## 2022-12-14 DIAGNOSIS — R94.31 ABNORMAL EKG: ICD-10-CM

## 2022-12-14 PROCEDURE — 93306 TTE W/DOPPLER COMPLETE: CPT | Performed by: INTERNAL MEDICINE

## 2022-12-14 PROCEDURE — 25010000002 SULFUR HEXAFLUORIDE MICROSPH 60.7-25 MG RECONSTITUTED SUSPENSION: Performed by: INTERNAL MEDICINE

## 2022-12-14 PROCEDURE — 93306 TTE W/DOPPLER COMPLETE: CPT

## 2022-12-14 RX ADMIN — SULFUR HEXAFLUORIDE 2 ML: KIT at 14:26

## 2022-12-23 LAB
BH CV ECHO MEAS - AO MAX PG: 7.8 MMHG
BH CV ECHO MEAS - AO MEAN PG: 4.4 MMHG
BH CV ECHO MEAS - AO ROOT DIAM: 2.8 CM
BH CV ECHO MEAS - AO V2 MAX: 139.6 CM/SEC
BH CV ECHO MEAS - AO V2 VTI: 30 CM
BH CV ECHO MEAS - AVA(I,D): 2.21 CM2
BH CV ECHO MEAS - EDV(CUBED): 99.9 ML
BH CV ECHO MEAS - EDV(MOD-SP4): 92.3 ML
BH CV ECHO MEAS - EF(MOD-BP): 54 %
BH CV ECHO MEAS - EF(MOD-SP4): 53.6 %
BH CV ECHO MEAS - ESV(CUBED): 37.5 ML
BH CV ECHO MEAS - ESV(MOD-SP4): 42.8 ML
BH CV ECHO MEAS - FS: 27.9 %
BH CV ECHO MEAS - IVS/LVPW: 0.8 CM
BH CV ECHO MEAS - IVSD: 0.7 CM
BH CV ECHO MEAS - LA DIMENSION: 4.1 CM
BH CV ECHO MEAS - LV DIASTOLIC VOL/BSA (35-75): 44.2 CM2
BH CV ECHO MEAS - LV MASS(C)D: 117 GRAMS
BH CV ECHO MEAS - LV MAX PG: 4.2 MMHG
BH CV ECHO MEAS - LV MEAN PG: 2.44 MMHG
BH CV ECHO MEAS - LV SYSTOLIC VOL/BSA (12-30): 20.5 CM2
BH CV ECHO MEAS - LV V1 MAX: 102 CM/SEC
BH CV ECHO MEAS - LV V1 VTI: 24.8 CM
BH CV ECHO MEAS - LVIDD: 4.6 CM
BH CV ECHO MEAS - LVIDS: 3.3 CM
BH CV ECHO MEAS - LVOT AREA: 2.7 CM2
BH CV ECHO MEAS - LVOT DIAM: 1.85 CM
BH CV ECHO MEAS - LVPWD: 0.88 CM
BH CV ECHO MEAS - MV A MAX VEL: 71.8 CM/SEC
BH CV ECHO MEAS - MV DEC SLOPE: 252.5 CM/SEC2
BH CV ECHO MEAS - MV DEC TIME: 0.25 MSEC
BH CV ECHO MEAS - MV E MAX VEL: 63.8 CM/SEC
BH CV ECHO MEAS - MV E/A: 0.89
BH CV ECHO MEAS - MV MAX PG: 4.7 MMHG
BH CV ECHO MEAS - MV MEAN PG: 1.93 MMHG
BH CV ECHO MEAS - MV V2 VTI: 33.1 CM
BH CV ECHO MEAS - MVA(VTI): 2 CM2
BH CV ECHO MEAS - PA ACC TIME: 0.14 SEC
BH CV ECHO MEAS - PA PR(ACCEL): 14.6 MMHG
BH CV ECHO MEAS - RAP SYSTOLE: 8 MMHG
BH CV ECHO MEAS - RV MAX PG: 2.44 MMHG
BH CV ECHO MEAS - RV V1 MAX: 78.1 CM/SEC
BH CV ECHO MEAS - RV V1 VTI: 21.7 CM
BH CV ECHO MEAS - RVDD: 4 CM
BH CV ECHO MEAS - SI(MOD-SP4): 23.7 ML/M2
BH CV ECHO MEAS - SV(LVOT): 66.3 ML
BH CV ECHO MEAS - SV(MOD-SP4): 49.5 ML
MAXIMAL PREDICTED HEART RATE: 150 BPM
STRESS TARGET HR: 128 BPM

## 2023-08-31 ENCOUNTER — TELEPHONE (OUTPATIENT)
Dept: FAMILY MEDICINE CLINIC | Facility: CLINIC | Age: 73
End: 2023-08-31

## 2024-08-07 ENCOUNTER — OFFICE VISIT (OUTPATIENT)
Dept: SLEEP MEDICINE | Facility: CLINIC | Age: 74
End: 2024-08-07
Payer: COMMERCIAL

## 2024-08-07 VITALS
SYSTOLIC BLOOD PRESSURE: 144 MMHG | HEIGHT: 60 IN | DIASTOLIC BLOOD PRESSURE: 66 MMHG | OXYGEN SATURATION: 95 % | WEIGHT: 253.2 LBS | BODY MASS INDEX: 49.71 KG/M2 | HEART RATE: 78 BPM

## 2024-08-07 DIAGNOSIS — F32.A DEPRESSION, UNSPECIFIED DEPRESSION TYPE: ICD-10-CM

## 2024-08-07 DIAGNOSIS — E66.01 CLASS 3 SEVERE OBESITY WITH SERIOUS COMORBIDITY AND BODY MASS INDEX (BMI) OF 45.0 TO 49.9 IN ADULT, UNSPECIFIED OBESITY TYPE: ICD-10-CM

## 2024-08-07 DIAGNOSIS — R06.83 SNORING: ICD-10-CM

## 2024-08-07 DIAGNOSIS — F41.9 ANXIETY: ICD-10-CM

## 2024-08-07 DIAGNOSIS — I10 ESSENTIAL HYPERTENSION: ICD-10-CM

## 2024-08-07 DIAGNOSIS — G47.19 EXCESSIVE DAYTIME SLEEPINESS: ICD-10-CM

## 2024-08-07 DIAGNOSIS — R06.81 WITNESSED EPISODE OF APNEA: ICD-10-CM

## 2024-08-07 DIAGNOSIS — Z72.821 INADEQUATE SLEEP HYGIENE: ICD-10-CM

## 2024-08-07 DIAGNOSIS — R29.818 SUSPECTED SLEEP APNEA: Primary | ICD-10-CM

## 2024-08-07 PROCEDURE — 99204 OFFICE O/P NEW MOD 45 MIN: CPT | Performed by: FAMILY MEDICINE

## 2024-08-07 PROCEDURE — G0463 HOSPITAL OUTPT CLINIC VISIT: HCPCS

## 2024-08-07 NOTE — PROGRESS NOTES
[facility]  [facility] [facility], [address]  [place] [address]  Phone   Fax       Anais West  [phone]   1950  74 y.o.  female      PCP Kate Long APRN    Type of service: Initial Sleep Medicine Consult.  Date of service: 8/7/2024      Chief Complaint   Patient presents with    Snoring    Witnessed Apnea       History of present illness;  The patient was seen today on 8/7/2024 at [facility].    Thank you for asking to see Anais West, 74 y.o. PMHx HTN, anxiety, depression, DM.  The patient presents for initial evaluation of sleep sleep disordered breathing.  Patient  denies prior surgery namely tonsillectomy, nasal surgery or UPPP.       Snoring and witnessed apneas > 10 years  Unable to supply me with records of any past sleep studies      Obstructive Sleep Apnea Screening: STOP-BANG Sleep Apnea Questionnaire. Reference: Ponce F et al. Br J Anaesth, 2012.     Criterion    Yes    No  Do you SNORE loudly?   [x]   Yes  []   No   Do you often feel TIRED, fatigued, or sleepy during the day?    [x]   Yes  []   No  Has anyone OBSERVED you stop breathing during your sleep?    [x]   Yes  []   No  Do you have or are you being treated for high blood PRESSURE?    [x]   Yes  []   No  BMI >32 kg/m2     [x]   Yes  []   No  AGE > 50 years    [x]   Yes  []   No  NECK circumference >16 inches / 40 cm    [x]   Yes  []   No  GENDER: male     []   Yes  [x]   No    NEPTALI Probability:  []   1-2 - Low  []   3-4 - Intermediate  [x]   5-8 - High      Save what is noted above in HPI, denies any OTHER past known:  cardiopulmonary conditions/neurologic disorders/neuromuscular disorders  Never needed supplemental O2 at home  Denies any opioid therapy  Denies any metal in head/neck/chest        Further Sleep History:    Bedtime: 8:15 PM  Rise Time: 4 AM  Sleep Latency: 30-minute  Screens in bed: Yes  Wake after sleep onset: 4 time  Reasons for awakenings: Nocturia or  "apneic event  Number of naps per day at least 1/day  Naps restorative: Denies  Caffeine use: 1 beverage per day    RLS Symptoms: No   Bruxism:No   Current sleep related gastroesophageal reflux symptoms:  No   Cataplexy:  No   Sleep Paralysis:  No   Hypnagogic or hypnopompic hallucinations: No   Parasomnias such as sleep walking or sleep eating No     Disclaimer Sleep History: The above sleep history is based on this sleep physician's in room encounter with the patient. Pre encounter self administered questionnaires are taken into consideration and discussed with patient for any discordance. The above documentation by this sleep physician is the most accurate clinical information determined by in room sleep physician encounter with patient.     MEDICAL CONDITIONS (PMH)   HTN  Anxiety  Depression  DM  Morbid obesity  Arthritis    Social history:  Do you drive a commercial vehicle:  No   Shift work:  No   Tobacco use:  No   Alcohol use: 0 per week  Occupation: Retired    Family Hx (parents and siblings) (pertaining to sleep medicine)  HTN  CAD  COPD  DM    Medications: reviewed    Review of systems is negative unless otherwise noted per HPI   Disclaimer History: The above history is based on this sleep physician's in room encounter with the patient. Pre encounter self administered questionnaires are taken into consideration and discussed with patient for any discordance. The above documentation by this sleep physician is the most accurate clinical information determined by in room sleep physician encounter with patient.     Physical exam:  Vitals:    08/07/24 1300   BP: 144/66   BP Location: Left arm   Patient Position: Sitting   Pulse: 78   SpO2: 95%   Weight: 115 kg (253 lb 3.2 oz)   Height: 152.4 cm (60\")    Body mass index is 49.45 kg/m².   CONSTITUTIONAL:  Non-toxic, In no overt distress   Head: normocephalic   ENT: Mallampati class IV, + macroglossia, no septal defects   NECK:Neck Circumference: 18.5 inches,no " nuchal rigidity  RESPIRATORY SYSTEM: Breath sounds are clear (no rales, no rhonchi, no wheezes), no accessory muscle use  CARDIOVASULAR SYSTEM: Heart sounds are regular rhythm and normal rate, no rub, no gallop, no edema  NEUROLOGICAL SYSTEM: Oriented x 3, No gross focal deficits   PSYCHIATRIC SYSTEM: Goal oriented, affect full range appropriate          Assessment and plan:  Suspected sleep apnea [R29.818] patient's symptoms and examination is consistent with sleep apnea (G47.30). I have talked to the patient about the signs and symptoms of sleep apnea. In addition, I have also discussed pathophysiology of sleep apnea.  I also discussed the complications of untreated sleep apnea including effects on hypertension, diabetes mellitus and nonrestorative sleep with hypersomnia which can increase risk for motor vehicle accidents.  Untreated sleep apnea is also a risk factor for development of atrial fibrillation, hypertension, insulin resistance and cerebrovascular accident.  Discussed in detail of various testing methods including home-based and lab based sleep studies.  Based on history and physical examination and other comorbidities the most appropriate study is Home Sleep Study.  [facility].  The test will be scheduled after approval from insurance. Treatment and management will be discussed after the test is completed. High pretest probability STOP-BANG 7/8, macroglossia, Mallampati is IV.  Home sleep study may rule in sleep apnea.  However, under patient's specific clinical circumstances home sleep study may not rule out sleep apnea.  If home sleep testing is negative must proceed with in laboratory polysomnography to definitively rule out sleep apnea (the prior was discussed with patient). Patient was given opportunity to ask questions and all the questions were answered.   Snoring (R06.83), snoring is the sound created by turbulent airflow vibrating upper airway soft tissue  due to limitation of inspiratory airflow. I have also discussed factors affecting snoring including sleep deprivation, sleeping on the back and alcohol ingestion. To minimize snoring, patient is advised to have adequate sleep, sleep on the side and avoid alcohol and sedative medications before bedtime  Excessive daytime sleepiness .  Patient endorses subjective excessive daytime sleepiness with sleep physician encounter which was consistent with patient's pre-encounter self-administered [place] Sleepiness Scale of 12/24  There are many causes for daytime excessive sleepiness including depression, shiftwork syndrome, and other medical disorders including heart, kidney and liver failure.  From sleep disorders perspective this is sleep disordered breathing until proven otherwise. The most common cause of excessive sleepiness is due to sleep apnea with frequent awakenings during sleep time.  I have discussed safety of driving and to remain vigilant while driving; patient verbalized understanding of counseling.  Obesity, patient's BMI is Body mass index is 49.45 kg/m².. I have discussed the relationship between weight and sleep apnea.There is direct correlation between weight and severity of sleep apnea.  Weight reduction is encouraged, as it is going to reduce the severity of sleep apnea. I have also discussed with the patient diet and exercise to achieve ideal body weight.  Inadequate sleep hygiene [Z72.821]  Counseled the patient lifestyle modifications as below to be applied especially night of any sleep study, the patient verbalized understanding of same. Follow up with PCP to reinforce my counseling towards healthy lifestyle modifications if sleep studies are negative.  HTN,  Follow up with primary care physician for continued management. This medical condition would make the patient eligible for a trial of PAP therapy even if sleep study reveals mild severity sleep apnea.  Anxiety/Depression, Follow up with  primary care physician for continued management. Comorbid mood disorders would make the patient eligible for a trial of PAP therapy even if sleep study reveals mild severity sleep apnea.      I have also discussed with the patient the following  Sleep hygiene: Maintaining a regular bedtime and wake time, not to watch television or work in bed, limit caffeine-containing beverages before bed time and avoid naps during the day  Adequate amount of sleep.  Generally most people needs about 7 to 8 hours of sleep.       Patient's questions were answered      I once again thank you for asking me to see this patient in consultation and I have forwarded my opinion and treatment plan.  Please do not hesitate to call me if you have any questions.       EMR Dragon/Transcription disclaimer:   Much of this encounter note is an electronic transcription/translation of spoken language to printed text. The electronic translation of spoken language may permit erroneous, or at times, nonsensical words or phrases to be inadvertently transcribed; Although I have reviewed the note for such errors, some may still exist.     NPI #: [id number]    Sulaiman Mak, DO  Sleep Medicine  [facility]  08/07/24

## 2024-09-09 ENCOUNTER — HOSPITAL ENCOUNTER (OUTPATIENT)
Dept: SLEEP MEDICINE | Facility: HOSPITAL | Age: 74
Discharge: HOME | End: 2024-09-09
Admitting: FAMILY MEDICINE
Payer: COMMERCIAL

## 2024-09-09 DIAGNOSIS — R29.818 SUSPECTED SLEEP APNEA: ICD-10-CM

## 2024-09-09 DIAGNOSIS — G47.19 EXCESSIVE DAYTIME SLEEPINESS: ICD-10-CM

## 2024-09-09 DIAGNOSIS — R06.81 WITNESSED EPISODE OF APNEA: ICD-10-CM

## 2024-09-09 DIAGNOSIS — R06.83 SNORING: ICD-10-CM

## 2024-09-09 PROCEDURE — 95806 SLEEP STUDY UNATT&RESP EFFT: CPT

## 2024-09-11 DIAGNOSIS — G47.33 OBSTRUCTIVE SLEEP APNEA, ADULT: Primary | ICD-10-CM

## 2024-09-11 DIAGNOSIS — F41.9 ANXIETY: ICD-10-CM

## 2024-09-11 PROCEDURE — 95806 SLEEP STUDY UNATT&RESP EFFT: CPT | Performed by: FAMILY MEDICINE

## 2024-11-11 ENCOUNTER — OFFICE VISIT (OUTPATIENT)
Dept: SLEEP MEDICINE | Facility: CLINIC | Age: 74
End: 2024-11-11
Payer: COMMERCIAL

## 2024-11-11 VITALS
OXYGEN SATURATION: 95 % | HEART RATE: 84 BPM | BODY MASS INDEX: 48.1 KG/M2 | WEIGHT: 245 LBS | DIASTOLIC BLOOD PRESSURE: 65 MMHG | SYSTOLIC BLOOD PRESSURE: 132 MMHG | HEIGHT: 60 IN

## 2024-11-11 DIAGNOSIS — F32.A DEPRESSION, UNSPECIFIED DEPRESSION TYPE: ICD-10-CM

## 2024-11-11 DIAGNOSIS — R06.83 SNORING: ICD-10-CM

## 2024-11-11 DIAGNOSIS — G47.10 HYPERSOMNIA: ICD-10-CM

## 2024-11-11 DIAGNOSIS — G47.8 NON-RESTORATIVE SLEEP: ICD-10-CM

## 2024-11-11 DIAGNOSIS — Z78.9 DIFFICULTY USING CONTINUOUS POSITIVE AIRWAY PRESSURE (CPAP) DEVICE: ICD-10-CM

## 2024-11-11 DIAGNOSIS — G47.33 OBSTRUCTIVE SLEEP APNEA, ADULT: Primary | ICD-10-CM

## 2024-11-11 DIAGNOSIS — E66.01 MORBID OBESITY: ICD-10-CM

## 2024-11-11 PROCEDURE — 99214 OFFICE O/P EST MOD 30 MIN: CPT | Performed by: FAMILY MEDICINE

## 2024-11-11 PROCEDURE — G0463 HOSPITAL OUTPT CLINIC VISIT: HCPCS

## 2024-11-11 NOTE — PROGRESS NOTES
"Follow Up [facility] Disorders Center Note     Chief Complaint:  NEPTALI     Primary Care Physician: Kate Long APRN    Interval History:   The patient is a 74 y.o. female  who was last seen in the sleep lab: 9/9/2024 for HST which showed AHI 7.9 lowest SpO2 89%.  Advised auto CPAP 10-20 cm H2O by Dr. Mak.  Today patient reports intolerance to CPAP and like to look into other options for treatment.  No pressure changes or mask fittings have been done since receiving auto CPAP. Of note HST also showed a positional component with supine AHI was 11 events per hour and left side AHI was 4 events per hour and right-sided AHI was 0 events per hour.  Average SpO2 for the night was 94% and patient only spent 1.1 minutes of sleep less than 89%.      Review of Systems:    A complete review of systems was done and all were negative with the exception of see scanned media    Social History:    Social History     Socioeconomic History    Marital status:    Tobacco Use    Smoking status: Never    Smokeless tobacco: Never   Vaping Use    Vaping status: Never Used   Substance and Sexual Activity    Alcohol use: Never     Comment: Daily caffeine use    Drug use: Never    Sexual activity: Defer     Partners: Male     Birth control/protection: Post-menopausal       Allergies:  Penicillins and Sulfa antibiotics     Medication Review:  Reviewed.      Vital Signs:    Vitals:    11/11/24 0900   BP: 132/65   BP Location: Left arm   Patient Position: Sitting   Pulse: 84   SpO2: 95%   Weight: 111 kg (245 lb)   Height: 152.4 cm (60\")     Body mass index is 47.85 kg/m².    Physical Exam:    Constitutional:  Well developed 74 y.o. female that appears in no apparent distress.  Awake & oriented times 3.  Normal mood with normal recent and remote memory and normal judgement.  Eyes:  Conjunctivae normal.  Oropharynx: Previously, moist mucous membranes.    Self-administered [place] Sleepiness Scale test results: See scanned media  0-5 Lower " normal daytime sleepiness  6-10 Higher normal daytime sleepiness  11-12 Mild, 13-15 Moderate, & 16-24 Severe excessive daytime sleepiness    Impression:   1. Obstructive sleep apnea, adult    2. Difficulty using continuous positive airway pressure (CPAP) device    3. Hypersomnia    4. Non-restorative sleep    5. Snoring    6. Morbid obesity    7. Depression, unspecified depression type      Difficulty with CPAP at current settings.  Pressure feels too high when she first turns around and later in the night.  Does well with her fullface mask.  Did tolerate CPAP well years ago however since COVID infection had difficulty.  Does feel untreated NEPTALI is worsening her depression.  Is on Seroquel from other providers for insomnia.    Plan:  Good sleep hygiene measures should be maintained.  Weight loss would be beneficial in this patient who is morbidly obese by Body mass index is 47.85 kg/m²..      The patient will continue CPAP.  After clinical evaluation and review of downloads, I recommend changing ramp start pressure to 6 cm H2O ramp time to 45 minutes and set pressure to 8 cm H2O.  A new prescription will be sent to the patient's DME.    Caution during activities that require prolonged concentration is strongly advised if sleepiness returns. Changing of PAP supplies regularly is important for effective use. Patient needs to change cushion on the mask or plugs on nasal pillows along with disposable filters once every month and change mask frame, tubing, headgear and Velcro straps every 6 months at the minimum.    I answered all of the patient's questions.  The patient will call for any problems and will follow up in 6 weeks.      Joshua Newsome MD  Sleep Medicine  11/11/24  10:20 EST

## 2024-12-19 NOTE — TELEPHONE ENCOUNTER
"  Caller: Anais West \"Tanisha\"    Relationship: Self    Best call back number: [phone]     What was the call regarding: PATIENT WOULD LIKE TO KNOW IF DR. GUILLEN WOULD CONSIDER TAKING HER BACK AS A PATIENT.     PLEASE CALL TO ADVISE.     " Refill Routing Note   Medication(s) are not appropriate for processing by [facility] for the following reason(s):        Non-participating provider    [facility] action(s):  Route             Appointments  past 12m or future 3m with PCP    Date Provider   Last Visit   11/19/2024 Belen Wang, NP   Next Visit   Visit date not found Belen Wang, NP   ED visits in past 90 days: 0        Note composed:1:37 PM 12/19/2024

## (undated) DEVICE — Device: Brand: DEFENDO AIR/WATER/SUCTION AND BIOPSY VALVE

## (undated) DEVICE — THE TORRENT IRRIGATION SCOPE CONNECTOR IS USED WITH THE TORRENT IRRIGATION TUBING TO PROVIDE IRRIGATION FLUIDS SUCH AS STERILE WATER DURING GASTROINTESTINAL ENDOSCOPIC PROCEDURES WHEN USED IN CONJUNCTION WITH AN IRRIGATION PUMP (OR ELECTROSURGICAL UNIT).: Brand: TORRENT

## (undated) DEVICE — TUBING, SUCTION, 1/4" X 10', STRAIGHT: Brand: MEDLINE

## (undated) DEVICE — CANN NASL CO2 TRULINK W/O2 A/

## (undated) DEVICE — TBG 02 CRUSH RESIST LF CLR 7FT

## (undated) DEVICE — BITEBLOCK OMNI BLOC

## (undated) DEVICE — FRCP BX RADJAW4 NDL 2.8 240CM LG OG BX40

## (undated) DEVICE — CANN O2 ETCO2 FITS ALL CONN CO2 SMPL A/ 7IN DISP LF

## (undated) DEVICE — LN SMPL CO2 SHTRM SD STREAM W/M LUER

## (undated) DEVICE — ADAPT CLN BIOGUARD AIR/H2O DISP

## (undated) DEVICE — SENSR O2 OXIMAX FNGR A/ 18IN NONSTR

## (undated) DEVICE — KT ORCA ORCAPOD DISP STRL